# Patient Record
Sex: MALE | Race: WHITE | Employment: FULL TIME | URBAN - NONMETROPOLITAN AREA
[De-identification: names, ages, dates, MRNs, and addresses within clinical notes are randomized per-mention and may not be internally consistent; named-entity substitution may affect disease eponyms.]

---

## 2020-02-10 ENCOUNTER — TELEPHONE (OUTPATIENT)
Dept: GASTROENTEROLOGY | Age: 55
End: 2020-02-10

## 2020-02-11 ENCOUNTER — TELEPHONE (OUTPATIENT)
Dept: GASTROENTEROLOGY | Age: 55
End: 2020-02-11

## 2020-02-14 ENCOUNTER — OFFICE VISIT (OUTPATIENT)
Dept: GASTROENTEROLOGY | Age: 55
End: 2020-02-14
Payer: COMMERCIAL

## 2020-02-14 VITALS
WEIGHT: 212.2 LBS | BODY MASS INDEX: 26.38 KG/M2 | HEIGHT: 75 IN | HEART RATE: 58 BPM | OXYGEN SATURATION: 97 % | SYSTOLIC BLOOD PRESSURE: 118 MMHG | DIASTOLIC BLOOD PRESSURE: 78 MMHG

## 2020-02-14 LAB — TRIGL SERPL-MCNC: 185 MG/DL (ref 0–149)

## 2020-02-14 PROCEDURE — 99203 OFFICE O/P NEW LOW 30 MIN: CPT | Performed by: NURSE PRACTITIONER

## 2020-02-14 RX ORDER — OMEPRAZOLE 20 MG/1
CAPSULE, DELAYED RELEASE ORAL
COMMUNITY
Start: 2020-01-29

## 2020-02-14 ASSESSMENT — ENCOUNTER SYMPTOMS
RECTAL PAIN: 0
NAUSEA: 0
TROUBLE SWALLOWING: 0
ABDOMINAL DISTENTION: 0
SHORTNESS OF BREATH: 0
CONSTIPATION: 0
ANAL BLEEDING: 0
DIARRHEA: 0
SORE THROAT: 0
VOMITING: 0
BLOOD IN STOOL: 0
BACK PAIN: 0
ABDOMINAL PAIN: 0
VOICE CHANGE: 0
COUGH: 0

## 2020-02-14 NOTE — PATIENT INSTRUCTIONS
You are going to have an Endoscopy and here are some basic instructions:    Nothing to eat or drink after midnight EXCEPT:  PLEASE TAKE MEDICATION(S) FOR HIGH BLOOD PRESSURE, SEIZURES, HEART, AND THYROID WITH A SIP OF WATER AT LEAST 2 HOURS PRIOR TO ARRIVAL TIME.   YOU MAY ALSO TAKE ANY INHALERS YOU ARE PRESCRIBED. You will not be able to drive for 24 hours after the procedure due to sedation. Bring a  with you the day of the procedure. No aspirin, ibuprofen, naproxen, fish oil or vitamin E for 5 days before procedure. Continue current medications. If you are on blood thinners, clearance from the prescribing physician will be obtained before your procedure is scheduled. Increased [email or web address] may be associated with discontinuation of your blood thinner and include, but not limited to, stroke, TIA, or cardiac event. If biopsies are taken during the procedure they will be sent to a pathologist for analysis. You will be notified by mail of the pathology results in 2-3 weeks. Your physician may also schedule a follow up appointment with the nurse practitioner to discuss pathology, symptoms or to check if you have had any problems related to your procedure. If you prefer not to return to the office after your procedure please discuss this with your physician on the day of your procedure.

## 2020-02-14 NOTE — PROGRESS NOTES
asthma exacerbation
and neck pain. Neurological: Negative for dizziness, weakness, light-headedness and headaches. Psychiatric/Behavioral: Negative for dysphoric mood and sleep disturbance. The patient is not nervous/anxious. All other systems reviewed and are negative. Objective:     Physical Exam  Vitals signs and nursing note reviewed. Constitutional:       Appearance: He is well-developed. Comments: /78   Pulse 58   Ht 6' 3\" (1.905 m)   Wt 212 lb 3.2 oz (96.3 kg)   SpO2 97%   BMI 26.52 kg/m²    Eyes:      General: No scleral icterus. Conjunctiva/sclera: Conjunctivae normal.      Pupils: Pupils are equal, round, and reactive to light. Neck:      Musculoskeletal: Normal range of motion and neck supple. Thyroid: No thyromegaly. Cardiovascular:      Rate and Rhythm: Normal rate and regular rhythm. Heart sounds: Normal heart sounds. No murmur. No friction rub. No gallop. Pulmonary:      Effort: Pulmonary effort is normal. No respiratory distress. Breath sounds: Normal breath sounds. Abdominal:      General: Bowel sounds are normal. There is no distension. Palpations: Abdomen is soft. Tenderness: There is no abdominal tenderness. There is no rebound. Musculoskeletal: Normal range of motion. General: No deformity. Skin:     Coloration: Skin is not pale. Neurological:      Mental Status: He is alert and oriented to person, place, and time. Cranial Nerves: No cranial nerve deficit. Psychiatric:         Judgment: Judgment normal.           Assessment:       Diagnosis Orders   1. Recurrent pancreatitis  ENDOSCOPIC ULTRASOUND (UPPER)    ESOPHAGOSCOPY / EGD    Triglyceride    IgG 4         Plan:      1. Labs today- will call him with results. 2. EGD with EUS  Schedule outpatient endoscopy/EUS. Patient advised no Aspirin, Fish Oil, Vit E or NSAIDs 5 (five) days before procedure.   Follow-up Visit: per Dr. Radha Estes per pt request  Pt Education:   Risks,

## 2020-02-17 LAB — IGG 4: 34 MG/DL (ref 1–123)

## 2020-02-18 ENCOUNTER — TELEPHONE (OUTPATIENT)
Dept: GASTROENTEROLOGY | Age: 55
End: 2020-02-18

## 2020-02-18 NOTE — TELEPHONE ENCOUNTER
----- Message from Eldora Paget, APRN sent at 2/17/2020  9:22 PM CST -----  Please let Ailin Salmeron know the autoimmune marker for pancreatitis is normal. Also let him know his triglyceride level is a little elevated at 185, normal is <150, but this slight elevation would not cause pancreatitis.  thanks

## 2020-03-17 ENCOUNTER — ANESTHESIA EVENT (OUTPATIENT)
Dept: ENDOSCOPY | Age: 55
End: 2020-03-17
Payer: COMMERCIAL

## 2020-03-17 ENCOUNTER — TELEPHONE (OUTPATIENT)
Dept: GASTROENTEROLOGY | Age: 55
End: 2020-03-17

## 2020-03-18 ENCOUNTER — HOSPITAL ENCOUNTER (OUTPATIENT)
Age: 55
Setting detail: OUTPATIENT SURGERY
Discharge: HOME | End: 2020-03-18
Attending: INTERNAL MEDICINE | Admitting: INTERNAL MEDICINE
Payer: COMMERCIAL

## 2020-03-18 ENCOUNTER — ANESTHESIA (OUTPATIENT)
Dept: ENDOSCOPY | Age: 55
End: 2020-03-18
Payer: COMMERCIAL

## 2020-03-18 VITALS
SYSTOLIC BLOOD PRESSURE: 117 MMHG | OXYGEN SATURATION: 100 % | HEIGHT: 75 IN | DIASTOLIC BLOOD PRESSURE: 69 MMHG | TEMPERATURE: 97.1 F | HEART RATE: 66 BPM | BODY MASS INDEX: 26.11 KG/M2 | WEIGHT: 210 LBS | RESPIRATION RATE: 20 BRPM

## 2020-03-18 VITALS
SYSTOLIC BLOOD PRESSURE: 100 MMHG | DIASTOLIC BLOOD PRESSURE: 64 MMHG | RESPIRATION RATE: 20 BRPM | OXYGEN SATURATION: 97 %

## 2020-03-18 PROCEDURE — 2500000003 HC RX 250 WO HCPCS: Performed by: NURSE ANESTHETIST, CERTIFIED REGISTERED

## 2020-03-18 PROCEDURE — 6360000002 HC RX W HCPCS: Performed by: NURSE ANESTHETIST, CERTIFIED REGISTERED

## 2020-03-18 PROCEDURE — 7100000011 HC PHASE II RECOVERY - ADDTL 15 MIN: Performed by: INTERNAL MEDICINE

## 2020-03-18 PROCEDURE — 3700000001 HC ADD 15 MINUTES (ANESTHESIA): Performed by: INTERNAL MEDICINE

## 2020-03-18 PROCEDURE — 3609018500 HC EGD US SCOPE W/ADJACENT STRUCTURES: Performed by: INTERNAL MEDICINE

## 2020-03-18 PROCEDURE — 2580000003 HC RX 258: Performed by: INTERNAL MEDICINE

## 2020-03-18 PROCEDURE — 3700000000 HC ANESTHESIA ATTENDED CARE: Performed by: INTERNAL MEDICINE

## 2020-03-18 PROCEDURE — 43259 EGD US EXAM DUODENUM/JEJUNUM: CPT | Performed by: INTERNAL MEDICINE

## 2020-03-18 PROCEDURE — 7100000010 HC PHASE II RECOVERY - FIRST 15 MIN: Performed by: INTERNAL MEDICINE

## 2020-03-18 PROCEDURE — 2709999900 HC NON-CHARGEABLE SUPPLY: Performed by: INTERNAL MEDICINE

## 2020-03-18 RX ORDER — SODIUM CHLORIDE, SODIUM LACTATE, POTASSIUM CHLORIDE, CALCIUM CHLORIDE 600; 310; 30; 20 MG/100ML; MG/100ML; MG/100ML; MG/100ML
INJECTION, SOLUTION INTRAVENOUS CONTINUOUS
Status: DISCONTINUED | OUTPATIENT
Start: 2020-03-18 | End: 2020-03-18 | Stop reason: HOSPADM

## 2020-03-18 RX ORDER — MIDAZOLAM HYDROCHLORIDE 1 MG/ML
INJECTION INTRAMUSCULAR; INTRAVENOUS PRN
Status: DISCONTINUED | OUTPATIENT
Start: 2020-03-18 | End: 2020-03-18 | Stop reason: SDUPTHER

## 2020-03-18 RX ORDER — LIDOCAINE HYDROCHLORIDE 20 MG/ML
INJECTION, SOLUTION INFILTRATION; PERINEURAL PRN
Status: DISCONTINUED | OUTPATIENT
Start: 2020-03-18 | End: 2020-03-18 | Stop reason: SDUPTHER

## 2020-03-18 RX ORDER — GLYCOPYRROLATE 0.2 MG/ML
INJECTION INTRAMUSCULAR; INTRAVENOUS PRN
Status: DISCONTINUED | OUTPATIENT
Start: 2020-03-18 | End: 2020-03-18 | Stop reason: SDUPTHER

## 2020-03-18 RX ORDER — FENTANYL CITRATE 50 UG/ML
INJECTION, SOLUTION INTRAMUSCULAR; INTRAVENOUS PRN
Status: DISCONTINUED | OUTPATIENT
Start: 2020-03-18 | End: 2020-03-18 | Stop reason: SDUPTHER

## 2020-03-18 RX ORDER — PROPOFOL 10 MG/ML
INJECTION, EMULSION INTRAVENOUS CONTINUOUS PRN
Status: DISCONTINUED | OUTPATIENT
Start: 2020-03-18 | End: 2020-03-18 | Stop reason: SDUPTHER

## 2020-03-18 RX ADMIN — SODIUM CHLORIDE, SODIUM LACTATE, POTASSIUM CHLORIDE, AND CALCIUM CHLORIDE: 600; 310; 30; 20 INJECTION, SOLUTION INTRAVENOUS at 10:16

## 2020-03-18 RX ADMIN — GLYCOPYRROLATE 0.2 MG: 1 INJECTION INTRAMUSCULAR; INTRAVENOUS at 10:20

## 2020-03-18 RX ADMIN — PROPOFOL 100 MCG/KG/MIN: 10 INJECTION, EMULSION INTRAVENOUS at 10:23

## 2020-03-18 RX ADMIN — MIDAZOLAM 2 MG: 1 INJECTION INTRAMUSCULAR; INTRAVENOUS at 10:19

## 2020-03-18 RX ADMIN — LIDOCAINE HYDROCHLORIDE 40 MG: 20 INJECTION, SOLUTION INFILTRATION; PERINEURAL at 10:23

## 2020-03-18 RX ADMIN — FENTANYL CITRATE 100 MCG: 50 INJECTION INTRAMUSCULAR; INTRAVENOUS at 10:23

## 2020-03-18 ASSESSMENT — PAIN - FUNCTIONAL ASSESSMENT: PAIN_FUNCTIONAL_ASSESSMENT: 0-10

## 2020-03-18 NOTE — ANESTHESIA POSTPROCEDURE EVALUATION
Department of Anesthesiology  Postprocedure Note    Patient: Emilio Garza  MRN: [id number]  YOB: 1965  Date of evaluation: 3/18/2020  Time:  11:00 AM     Procedure Summary     Date:  03/18/20 Room / Location:  [address]    Anesthesia Start:  1016 Anesthesia Stop:      Procedure:  EGD ESOPHAGOGASTRODUODENOSCOPY ULTRASOUND (N/A ) Diagnosis:  (RECURRENT PANCREATITIS)    Surgeon:  Shawna Johns MD Responsible Provider:  MINERVA Lane CRNA    Anesthesia Type:  general, TIVA ASA Status:  2          Anesthesia Type: general, TIVA    Viktor Phase I: Viktor Score: 10    Viktor Phase II:      Last vitals: Reviewed and per EMR flowsheets.        Anesthesia Post Evaluation    Patient location during evaluation: bedside  Patient participation: complete - patient participated  Level of consciousness: sleepy but conscious  Pain score: 0  Airway patency: patent  Nausea & Vomiting: no nausea and no vomiting  Complications: no  Cardiovascular status: hemodynamically stable and blood pressure returned to baseline  Respiratory status: acceptable and nasal cannula  Hydration status: stable

## 2020-03-18 NOTE — OP NOTE
Referring/Primary Care Provider: Prasanth PALMA    Date of Procedure: 03/18/20    Procedure:   1. EGD with Endoscopic Ultrasound     Indications:   1. Reported recurrent pancreatitis (although patient states he has had minimal hospitalizations) - I have no evidence of true lipase elevation of >3x ULN. He is s/p cholecystectomy. Triglycerides normal. IGG4 normal.     Anesthesia:  Sedation was administered by anesthesia who monitored the patient during the procedure. Procedure:   After reviewing the patient's chart, H&P, medications, obtaining informed consent, and discussing risks benefits and alternatives to the procedure the patient was placed in the left lateral decubitus position. A oblique viewing Olympus 160 Radial EUS scope was lubricated and inserted through the mouth into the oropharynx. Under indirect visualization, the upper esophagus was intubated. The scope was advanced to the level of the third portion of duodenum with limited views of the esophageal mucosa. Findings and maneuvers are listed in impression below. The patient tolerated the procedure well. There were no immediate complications. Findings:   Endoscopic Finding:   - Endoscopic views of the esophagus, stomach, and duodenum are all normal.     Endosonographic Findings:  - The celiac axis and associated vascular structures was identified and examined. No concerning or malignant lymphadenopathy was identified.     - Limited views of the left lobe of the liver revealed no biliary dilation or focal mass. - The EUS scope was advanced to the duodenal bulb. The CBD appears normal.  No Filling defects noted. - Pancreas: pancreatic parenchyma appeared normal.  There is no evidence of pancreatic ductal dilatation. There is no peripancreatic lymphadenopathy. Do not appreciate pancreatic divisum although EUS has overall low sensitivity for this. Estimated Blood Loss: minimal    IMPRESSION:  1.  No obvious cause for

## 2020-03-25 ENCOUNTER — TELEPHONE (OUTPATIENT)
Dept: GASTROENTEROLOGY | Age: 55
End: 2020-03-25

## 2020-03-25 NOTE — TELEPHONE ENCOUNTER
Pt called yesterday thinking that we were supposed to have already set him up for MRCP, after his 3/18/20 procedure with Dr Yessica Maddox. I read the note and it stated, \"if. .. Crow Burk \" I did check with Dr Yessica Maddox yesterday after this and he said we could go ahead and scheduled the MRCP. I called and discussed with patient today. Due to COVID-19 procedures, this is not emergent and they will likely schedule this at the end of May. We will need it scheduled before his June apt with Genora Gowers, APRN. PT is to either call or send a [employer] message if he needs something prior to this. Order is in [employer].

## 2020-03-27 NOTE — H&P
Patient Name: Rajat Garrett  : 1965  MRN: [id number]  DATE: 20    Allergies: Allergies   Allergen Reactions    Morphine Itching    Penicillins         ENDOSCOPY  History and Physical    Procedure:    [] Diagnostic Colonoscopy       [] Screening Colonoscopy  [x] EGD      [] ERCP      [x] EUS       [] Other    [x] Previous office notes/History and Physical reviewed from the patients chart. Please see EMR for further details of HPI. I have examined the patient's status immediately prior to the procedure and:      Indications/HPI:    []Abdominal Pain   []Cancer- GI/Lung     []Fhx of colon CA/polyps  []History of Polyps  []Barretts            []Melena  []Abnormal Imaging              []Dysphagia              []Persistent Pneumonia   []Anemia                            []Food Impaction        []History of Polyps  [] GI Bleed             []Pulmonary nodule/Mass   []Change in bowel habits []Heartburn/Reflux  []Rectal Bleed (BRBPR)  []Chest Pain - Non Cardiac []Heme (+) Stool []Ulcers  []Constipation  []Hemoptysis  []Varices  []Diarrhea  []Hypoxemia    []Nausea/Vomiting   []Screening   []Crohns/Colitis  [x]Other: pancreatitis     Anesthesia:   [x] MAC [] Moderate Sedation   [] General   [] None     ROS: 12 pt Review of Symptoms was negative unless mentioned above    Medications:   Prior to Admission medications    Medication Sig Start Date End Date Taking?  Authorizing Provider   omeprazole (PRILOSEC) 20 MG delayed release capsule  20  Yes Historical Provider, MD       Past Medical History:  Past Medical History:   Diagnosis Date    GERD (gastroesophageal reflux disease)     Pancreatitis        Past Surgical History:  Past Surgical History:   Procedure Laterality Date    CHOLECYSTECTOMY      UPPER GASTROINTESTINAL ENDOSCOPY N/A 3/18/2020    Dr Nuzhat Valladares-w/EUS-No obvious cause for pancreatitis currently       Social History:  Social History     Tobacco Use    Smoking status: Former Smoker Packs/day: 0.50     Years: 15.00     Pack years: 7.50     Last attempt to quit: 1996     Years since quittin.1    Smokeless tobacco: Never Used   Substance Use Topics    Alcohol use: Yes     Comment: social    Drug use: Never       Vital Signs:   Vitals:    20 1126   BP: 117/69   Pulse: 66   Resp: 20   Temp:    SpO2: 100%        Physical Exam:  Cardiac:  [x]WNL  []Comments:  Pulmonary:  [x]WNL   []Comments:  Neuro/Mental Status:  [x]WNL  []Comments:  Abdominal:  [x]WNL    []Comments:  Other:   []WNL  []Comments:    Informed Consent:  The risks and benefits of the procedure have been discussed with either the patient or if they cannot consent, their representative. Assessment:  Patient examined and appropriate for planned sedation and procedure. Plan:  Proceed with planned sedation and procedure as above.          Fernanda De La O MD

## 2020-06-04 ENCOUNTER — TELEPHONE (OUTPATIENT)
Dept: GASTROENTEROLOGY | Age: 55
End: 2020-06-04

## 2020-06-04 NOTE — TELEPHONE ENCOUNTER
Pt advised he is scheduled for MRCP @ [address] [facility], [email or web address], arrival is 2:30 PM, npo 6-8 hours prior, bring ins and id. He will keep his apt with [facility] @ 6/18/20. MRCP was recommended when he had procedure with Dr La Gusman 3/18/20. This was pushed out due to Theo.

## 2020-06-16 ENCOUNTER — HOSPITAL ENCOUNTER (OUTPATIENT)
Dept: MRI IMAGING | Age: 55
Discharge: HOME | End: 2020-06-16
Payer: COMMERCIAL

## 2020-06-16 ENCOUNTER — TELEPHONE (OUTPATIENT)
Dept: GASTROENTEROLOGY | Age: 55
End: 2020-06-16

## 2020-06-16 PROCEDURE — A9577 INJ MULTIHANCE: HCPCS | Performed by: INTERNAL MEDICINE

## 2020-06-16 PROCEDURE — 6360000004 HC RX CONTRAST MEDICATION: Performed by: INTERNAL MEDICINE

## 2020-06-16 PROCEDURE — 74183 MRI ABD W/O CNTR FLWD CNTR: CPT

## 2020-06-16 RX ADMIN — GADOBENATE DIMEGLUMINE 20 ML: 529 INJECTION, SOLUTION INTRAVENOUS at 16:38

## 2020-06-16 NOTE — TELEPHONE ENCOUNTER
I saw this pt in Feb 2020 and requested he f/u with Dr Alondra Bailey after scope due to the multiple of questions that I was not able to provide him with satisfied answers. I put on my checkout note to f/u with Alondra Bailey, but he is on my schedule for this Thursday.  This needs to be changed please, thanks

## 2020-06-19 ENCOUNTER — PATIENT MESSAGE (OUTPATIENT)
Dept: GASTROENTEROLOGY | Age: 55
End: 2020-06-19

## 2020-09-08 ENCOUNTER — TELEPHONE (OUTPATIENT)
Dept: GASTROENTEROLOGY | Age: 55
End: 2020-09-08

## 2020-09-08 ENCOUNTER — APPOINTMENT (OUTPATIENT)
Dept: CT IMAGING | Age: 55
End: 2020-09-08
Payer: COMMERCIAL

## 2020-09-08 ENCOUNTER — HOSPITAL ENCOUNTER (EMERGENCY)
Age: 55
Discharge: HOME | End: 2020-09-08
Attending: EMERGENCY MEDICINE
Payer: COMMERCIAL

## 2020-09-08 VITALS
RESPIRATION RATE: 20 BRPM | TEMPERATURE: 98 F | WEIGHT: 225 LBS | HEART RATE: 78 BPM | HEIGHT: 75 IN | SYSTOLIC BLOOD PRESSURE: 147 MMHG | DIASTOLIC BLOOD PRESSURE: 88 MMHG | BODY MASS INDEX: 27.98 KG/M2 | OXYGEN SATURATION: 95 %

## 2020-09-08 LAB
ALBUMIN SERPL-MCNC: 4.4 G/DL (ref 3.5–5.2)
ALP BLD-CCNC: 110 U/L (ref 40–130)
ALT SERPL-CCNC: 38 U/L (ref 5–41)
ANION GAP SERPL CALCULATED.3IONS-SCNC: 12 MMOL/L (ref 7–19)
AST SERPL-CCNC: 27 U/L (ref 5–40)
BASOPHILS ABSOLUTE: 0 K/UL (ref 0–0.2)
BASOPHILS RELATIVE PERCENT: 0.1 % (ref 0–1)
BILIRUB SERPL-MCNC: 0.8 MG/DL (ref 0.2–1.2)
BUN BLDV-MCNC: 14 MG/DL (ref 6–20)
CALCIUM SERPL-MCNC: 9.4 MG/DL (ref 8.6–10)
CHLORIDE BLD-SCNC: 103 MMOL/L (ref 98–111)
CO2: 27 MMOL/L (ref 22–29)
CREAT SERPL-MCNC: 0.9 MG/DL (ref 0.5–1.2)
EOSINOPHILS ABSOLUTE: 0 K/UL (ref 0–0.6)
EOSINOPHILS RELATIVE PERCENT: 0.2 % (ref 0–5)
GFR AFRICAN AMERICAN: >59
GFR NON-AFRICAN AMERICAN: >60
GLUCOSE BLD-MCNC: 108 MG/DL (ref 74–109)
HCT VFR BLD CALC: 45.7 % (ref 42–52)
HEMOGLOBIN: 15.7 G/DL (ref 14–18)
IMMATURE GRANULOCYTES #: 0.1 K/UL
LACTIC ACID: 1.3 MMOL/L (ref 0.5–1.9)
LIPASE: 286 U/L (ref 13–60)
LYMPHOCYTES ABSOLUTE: 0.9 K/UL (ref 1.1–4.5)
LYMPHOCYTES RELATIVE PERCENT: 7.4 % (ref 20–40)
MCH RBC QN AUTO: 30.3 PG (ref 27–31)
MCHC RBC AUTO-ENTMCNC: 34.4 G/DL (ref 33–37)
MCV RBC AUTO: 88.2 FL (ref 80–94)
MONOCYTES ABSOLUTE: 0.6 K/UL (ref 0–0.9)
MONOCYTES RELATIVE PERCENT: 5.4 % (ref 0–10)
NEUTROPHILS ABSOLUTE: 9.9 K/UL (ref 1.5–7.5)
NEUTROPHILS RELATIVE PERCENT: 86.4 % (ref 50–65)
PDW BLD-RTO: 11.4 % (ref 11.5–14.5)
PLATELET # BLD: 176 K/UL (ref 130–400)
PMV BLD AUTO: 11.8 FL (ref 9.4–12.4)
POTASSIUM SERPL-SCNC: 4.3 MMOL/L (ref 3.5–5)
RBC # BLD: 5.18 M/UL (ref 4.7–6.1)
SODIUM BLD-SCNC: 142 MMOL/L (ref 136–145)
TOTAL PROTEIN: 6.9 G/DL (ref 6.6–8.7)
WBC # BLD: 11.4 K/UL (ref 4.8–10.8)

## 2020-09-08 PROCEDURE — 83605 ASSAY OF LACTIC ACID: CPT

## 2020-09-08 PROCEDURE — 2580000003 HC RX 258: Performed by: EMERGENCY MEDICINE

## 2020-09-08 PROCEDURE — 74177 CT ABD & PELVIS W/CONTRAST: CPT

## 2020-09-08 PROCEDURE — 36415 COLL VENOUS BLD VENIPUNCTURE: CPT

## 2020-09-08 PROCEDURE — 96374 THER/PROPH/DIAG INJ IV PUSH: CPT

## 2020-09-08 PROCEDURE — 83690 ASSAY OF LIPASE: CPT

## 2020-09-08 PROCEDURE — 99999 PR OFFICE/OUTPT VISIT,PROCEDURE ONLY: CPT | Performed by: EMERGENCY MEDICINE

## 2020-09-08 PROCEDURE — 80053 COMPREHEN METABOLIC PANEL: CPT

## 2020-09-08 PROCEDURE — 93005 ELECTROCARDIOGRAM TRACING: CPT | Performed by: EMERGENCY MEDICINE

## 2020-09-08 PROCEDURE — 85025 COMPLETE CBC W/AUTO DIFF WBC: CPT

## 2020-09-08 PROCEDURE — 96375 TX/PRO/DX INJ NEW DRUG ADDON: CPT

## 2020-09-08 PROCEDURE — 99284 EMERGENCY DEPT VISIT MOD MDM: CPT

## 2020-09-08 PROCEDURE — 6360000004 HC RX CONTRAST MEDICATION: Performed by: EMERGENCY MEDICINE

## 2020-09-08 PROCEDURE — 6360000002 HC RX W HCPCS: Performed by: EMERGENCY MEDICINE

## 2020-09-08 PROCEDURE — 99285 EMERGENCY DEPT VISIT HI MDM: CPT

## 2020-09-08 RX ORDER — KETOROLAC TROMETHAMINE 30 MG/ML
30 INJECTION, SOLUTION INTRAMUSCULAR; INTRAVENOUS ONCE
Status: COMPLETED | OUTPATIENT
Start: 2020-09-08 | End: 2020-09-08

## 2020-09-08 RX ORDER — ONDANSETRON 4 MG/1
4 TABLET, ORALLY DISINTEGRATING ORAL EVERY 8 HOURS PRN
Qty: 15 TABLET | Refills: 0 | Status: SHIPPED | OUTPATIENT
Start: 2020-09-08

## 2020-09-08 RX ORDER — HYDROCODONE BITARTRATE AND ACETAMINOPHEN 5; 325 MG/1; MG/1
2 TABLET ORAL EVERY 6 HOURS PRN
Qty: 10 TABLET | Refills: 0 | Status: SHIPPED | OUTPATIENT
Start: 2020-09-08 | End: 2020-09-15

## 2020-09-08 RX ORDER — ONDANSETRON 2 MG/ML
4 INJECTION INTRAMUSCULAR; INTRAVENOUS ONCE
Status: COMPLETED | OUTPATIENT
Start: 2020-09-08 | End: 2020-09-08

## 2020-09-08 RX ORDER — SODIUM CHLORIDE, SODIUM LACTATE, POTASSIUM CHLORIDE, AND CALCIUM CHLORIDE .6; .31; .03; .02 G/100ML; G/100ML; G/100ML; G/100ML
1000 INJECTION, SOLUTION INTRAVENOUS ONCE
Status: COMPLETED | OUTPATIENT
Start: 2020-09-08 | End: 2020-09-08

## 2020-09-08 RX ADMIN — IOPAMIDOL 90 ML: 755 INJECTION, SOLUTION INTRAVENOUS at 18:24

## 2020-09-08 RX ADMIN — SODIUM CHLORIDE, POTASSIUM CHLORIDE, SODIUM LACTATE AND CALCIUM CHLORIDE 1000 ML: 600; 310; 30; 20 INJECTION, SOLUTION INTRAVENOUS at 17:56

## 2020-09-08 RX ADMIN — KETOROLAC TROMETHAMINE 30 MG: 30 INJECTION, SOLUTION INTRAMUSCULAR at 17:47

## 2020-09-08 RX ADMIN — ONDANSETRON 4 MG: 2 INJECTION INTRAMUSCULAR; INTRAVENOUS at 17:46

## 2020-09-08 ASSESSMENT — PAIN SCALES - GENERAL
PAINLEVEL_OUTOF10: 9
PAINLEVEL_OUTOF10: 2
PAINLEVEL_OUTOF10: 7

## 2020-09-08 ASSESSMENT — PAIN DESCRIPTION - ORIENTATION: ORIENTATION: UPPER

## 2020-09-08 ASSESSMENT — PAIN DESCRIPTION - LOCATION: LOCATION: ABDOMEN

## 2020-09-08 NOTE — TELEPHONE ENCOUNTER
IMPRESSION:  1. No obvious cause for pancreatitis currently.      RECOMMENDATIONS:   - Continue PPI  - NSAID avoidance  - If patient has true repeat evidence of pancreatitis (lipase elevation >3x ULN or evidence of pancreatitis on a contrasted CT scan) then I would consider an MRI with contrast + MRCP to better evaluate and rule out anatomic causes for recurrent pancreatitis.      The results were discussed with the patient and family. A copy of the images obtained were given to the patient.      Ulysses Green MD  03/18/20  10:51 AM      Onofre santos 2-14-20. Egd per  3-18-20. No FU scheduled. Patient called today from [phone] leaving a VM that he felt he might be flaring with Pancreatitis again and had though about going to ED for evaluation. I called the patient back , he did not answer his phone, I looked in Epic and I see where he is at the ED now but no note from ED are available at present as of 9-8-20 @ 4:20 pm. I will forward to [facility] apr to review tomorrow. I did leave the patient a VM when I called and told him if flaring with pancreatitis and having severe pain then going to the ED was the best thing to do. I will forward to [facility] apr to review tomorrow when she returns to clinic.  Napoleon christianson

## 2020-09-08 NOTE — ED PROVIDER NOTES
UPPER GASTROINTESTINAL ENDOSCOPY N/A 3/18/2020    Dr Dara Valladares-w/EUS-No obvious cause for pancreatitis currently         CURRENT MEDICATIONS     Discharge Medication List as of 2020  8:13 PM      CONTINUE these medications which have NOT CHANGED    Details   omeprazole (PRILOSEC) 20 MG delayed release capsule Historical Med             ALLERGIES     Morphine and Penicillins    FAMILY HISTORY       Family History   Problem Relation Age of Onset    Diabetes Mother           SOCIAL HISTORY       Social History     Socioeconomic History    Marital status:      Spouse name: None    Number of children: None    Years of education: None    Highest education level: None   Occupational History    None   Social Needs    Financial resource strain: None    Food insecurity     Worry: None     Inability: None    Transportation needs     Medical: None     Non-medical: None   Tobacco Use    Smoking status: Former Smoker     Packs/day: 0.50     Years: 15.00     Pack years: 7.50     Last attempt to quit: 1996     Years since quittin.6    Smokeless tobacco: Never Used   Substance and Sexual Activity    Alcohol use: Not Currently     Alcohol/week: 1.0 standard drinks     Types: 1 Shots of liquor per week     Comment: social    Drug use: Never    Sexual activity: None   Lifestyle    Physical activity     Days per week: None     Minutes per session: None    Stress: None   Relationships    Social connections     Talks on phone: None     Gets together: None     Attends Sikhism service: None     Active member of club or organization: None     Attends meetings of clubs or organizations: None     Relationship status: None    Intimate partner violence     Fear of current or ex partner: None     Emotionally abused: None     Physically abused: None     Forced sexual activity: None   Other Topics Concern    None   Social History Narrative    None       SCREENINGS    [place] Coma Scale  Eye Opening: Spontaneous  Best Verbal Response: Oriented  Best Motor Response: Obeys commands  [place] Coma Scale Score: 15        PHYSICAL EXAM    (up to 7 for level 4, 8 or more for level 5)     ED Triage Vitals [09/08/20 1614]   BP Temp Temp src Pulse Resp SpO2 Height Weight   (!) 156/90 98 °F (36.7 °C) -- 78 20 97 % 6' 3\" (1.905 m) 225 lb (102.1 kg)       Physical Exam  Vitals signs and nursing note reviewed. HENT:      Head: Atraumatic. Mouth/Throat:      Mouth: Mucous membranes are not dry. Pharynx: No posterior oropharyngeal erythema. Eyes:      General: No scleral icterus. Pupils: Pupils are equal, round, and reactive to light. Neck:      Trachea: No tracheal deviation. Cardiovascular:      Rate and Rhythm: Normal rate and regular rhythm. Heart sounds: Normal heart sounds. No murmur. Pulmonary:      Effort: Pulmonary effort is normal. No respiratory distress. Breath sounds: Normal breath sounds. No stridor. Abdominal:      General: There is no distension. Palpations: Abdomen is soft. Tenderness: There is abdominal tenderness (moderate, upper mid abdomen). There is no guarding. Musculoskeletal:      Right lower leg: No edema. Left lower leg: No edema. Skin:     Capillary Refill: Capillary refill takes less than 2 seconds. Coloration: Skin is not pale. Findings: No rash. Neurological:      Mental Status: He is alert and oriented to person, place, and time. Psychiatric:         Behavior: Behavior is cooperative. DIAGNOSTIC RESULTS     EKG: All EKG's areinterpreted by the Emergency Department Physician who either signs or Co-signs this chart in the absence of a cardiologist.    NSR @ 75, no ST elevation noted.     RADIOLOGY:  Non-plain film images such as CT, Ultrasound and MRI are read by the radiologist. Plain radiographic images are visualized and preliminarily interpreted bythe emergency physician with the below findings:        [address] [place] [address] IV CONTRAST Additional Contrast? None   Final Result   1. Pancreatitis   2. 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 Diverticulosis   4. Bilateral parapelvic renal cysts. Signed by Dr Sheyla Vitale on 9/8/2020 7:41 PM              LABS:  Labs Reviewed   CBC WITH AUTO DIFFERENTIAL - Abnormal; Notable for the following components:       Result Value    WBC 11.4 (*)     RDW 11.4 (*)     Neutrophils % 86.4 (*)     Lymphocytes % 7.4 (*)     Neutrophils Absolute 9.9 (*)     Lymphocytes Absolute 0.9 (*)     All other components within normal limits   LIPASE - Abnormal; Notable for the following components:    Lipase 286 (*)     All other components within normal limits   COMPREHENSIVE METABOLIC PANEL   LACTIC ACID, PLASMA       All other labs were within normal range or not returned as of this dictation. EMERGENCY DEPARTMENT COURSE and DIFFERENTIAL DIAGNOSIS/MDM:   Vitals:    Vitals:    09/08/20 1732 09/08/20 1806 09/08/20 1903 09/08/20 1932   BP: (!) 156/95 (!) 149/86 (!) 144/85 (!) 147/88   Pulse:       Resp:       Temp:       SpO2: 97% 99% 97% 95%   Weight:       Height:           MDM    Reassessment    Patient feeling better after some IV fluids, IV Toradol and Zofran. He would like to try to attempt discharge home. His laboratory studies look okay and I feel that this is the reason. PROCEDURES:  Unless otherwise noted below, none     Procedures    FINAL IMPRESSION      1.  Acute pancreatitis without infection or necrosis, unspecified pancreatitis type          DISPOSITION/PLAN   DISPOSITION Decision To Discharge 09/08/2020 07:56:13 PM      PATIENT REFERRED TO:  [id number] [facility] [address]  [phone]            DISCHARGE MEDICATIONS:  Discharge Medication List as of 9/8/2020  8:13 PM      START taking these medications    Details   ondansetron (ZOFRAN ODT) 4 MG disintegrating tablet Take 1 tablet by mouth every 8 hours as needed for Nausea or Vomiting, Disp-15 tablet,R-0Print      HYDROcodone-acetaminophen (NORCO) 5-325 MG per tablet Take 2 tablets by mouth every 6 hours as needed for Pain for up to 7 days. , Disp-10 tablet,R-0Print                (Please note that portions of this note were completed with a voice recognition program.  Efforts were made to edit thedictations but occasionally words are mis-transcribed.)    Vamsi Louis MD (electronically signed)  Attending Emergency Physician          Vamsi Louis MD  09/28/20 [id number]

## 2020-09-08 NOTE — TELEPHONE ENCOUNTER
Ok, there still arent any notes/labs/etc in epic at this time. if he has a f/u appt it needs to be with Dr Loc Ronquillo, thanks.

## 2020-09-08 NOTE — ED TRIAGE NOTES
Pt to ED with c/o abdominal pain due to possible pancreatitis attack Pt reports Hx of abdominal pain and pancreatitis

## 2020-09-09 LAB
EKG P AXIS: -14 DEGREES
EKG P-R INTERVAL: 196 MS
EKG Q-T INTERVAL: 352 MS
EKG QRS DURATION: 88 MS
EKG QTC CALCULATION (BAZETT): 378 MS
EKG T AXIS: 27 DEGREES

## 2020-09-09 PROCEDURE — 93010 ELECTROCARDIOGRAM REPORT: CPT | Performed by: INTERNAL MEDICINE

## 2020-09-09 NOTE — TELEPHONE ENCOUNTER
9-9-20  I tried calling this patient this morning to see how he was feeling after having gone to ED for his pancreatitis, he did not answer his phone and no VM picked up. [facility] aprn had mentioned a FU with  for further evaluation. I will try the patient back again later today.  Napoleon christianson

## 2020-09-28 ASSESSMENT — ENCOUNTER SYMPTOMS
ABDOMINAL PAIN: 1
BLOOD IN STOOL: 0
VOMITING: 1
SHORTNESS OF BREATH: 0
NAUSEA: 1
ABDOMINAL DISTENTION: 0
DIARRHEA: 0

## 2024-03-20 ENCOUNTER — APPOINTMENT (OUTPATIENT)
Dept: GENERAL RADIOLOGY | Age: 59
DRG: 661 | End: 2024-03-20
Attending: STUDENT IN AN ORGANIZED HEALTH CARE EDUCATION/TRAINING PROGRAM
Payer: COMMERCIAL

## 2024-03-20 ENCOUNTER — HOSPITAL ENCOUNTER (INPATIENT)
Age: 59
LOS: 2 days | Discharge: HOME | DRG: 661 | End: 2024-03-22
Attending: STUDENT IN AN ORGANIZED HEALTH CARE EDUCATION/TRAINING PROGRAM
Payer: COMMERCIAL

## 2024-03-20 DIAGNOSIS — R10.9 LEFT FLANK PAIN: ICD-10-CM

## 2024-03-20 DIAGNOSIS — N13.30 HYDRONEPHROSIS, LEFT: ICD-10-CM

## 2024-03-20 PROBLEM — I10 HTN (HYPERTENSION): Status: ACTIVE | Noted: 2024-03-20

## 2024-03-20 PROBLEM — N17.9 AKI (ACUTE KIDNEY INJURY) (HCC): Status: ACTIVE | Noted: 2024-03-20

## 2024-03-20 PROBLEM — K59.00 CONSTIPATION: Status: ACTIVE | Noted: 2024-03-20

## 2024-03-20 LAB
ALBUMIN SERPL-MCNC: 2.7 G/DL (ref 3.5–5.2)
ALP SERPL-CCNC: 152 U/L (ref 40–130)
ALT SERPL-CCNC: 18 U/L (ref 5–41)
ANION GAP SERPL CALCULATED.3IONS-SCNC: 14 MMOL/L (ref 7–19)
AST SERPL-CCNC: 13 U/L (ref 5–40)
BASOPHILS # BLD: 0 K/UL (ref 0–0.2)
BASOPHILS NFR BLD: 0.5 % (ref 0–1)
BILIRUB SERPL-MCNC: 0.3 MG/DL (ref 0.2–1.2)
BUN SERPL-MCNC: 15 MG/DL (ref 6–20)
CALCIUM SERPL-MCNC: 8.6 MG/DL (ref 8.6–10)
CHLORIDE SERPL-SCNC: 102 MMOL/L (ref 98–111)
CO2 SERPL-SCNC: 19 MMOL/L (ref 22–29)
CREAT SERPL-MCNC: 1.3 MG/DL (ref 0.5–1.2)
EOSINOPHIL # BLD: 0.2 K/UL (ref 0–0.6)
EOSINOPHIL NFR BLD: 2 % (ref 0–5)
ERYTHROCYTE [DISTWIDTH] IN BLOOD BY AUTOMATED COUNT: 10.7 % (ref 11.5–14.5)
GLUCOSE SERPL-MCNC: 121 MG/DL (ref 74–109)
HCT VFR BLD AUTO: 33.5 % (ref 42–52)
HGB BLD-MCNC: 11.8 G/DL (ref 14–18)
IMM GRANULOCYTES # BLD: 0 K/UL
LYMPHOCYTES # BLD: 0.8 K/UL (ref 1.1–4.5)
LYMPHOCYTES NFR BLD: 10.8 % (ref 20–40)
MAGNESIUM SERPL-MCNC: 2 MG/DL (ref 1.6–2.6)
MCH RBC QN AUTO: 29.7 PG (ref 27–31)
MCHC RBC AUTO-ENTMCNC: 35.2 G/DL (ref 33–37)
MCV RBC AUTO: 84.4 FL (ref 80–94)
MONOCYTES # BLD: 0.8 K/UL (ref 0–0.9)
MONOCYTES NFR BLD: 11.1 % (ref 0–10)
NEUTROPHILS # BLD: 5.6 K/UL (ref 1.5–7.5)
NEUTS SEG NFR BLD: 75.1 % (ref 50–65)
PLATELET # BLD AUTO: 321 K/UL (ref 130–400)
PMV BLD AUTO: 10.2 FL (ref 9.4–12.4)
POTASSIUM SERPL-SCNC: 4.2 MMOL/L (ref 3.5–5)
PROT SERPL-MCNC: 6.7 G/DL (ref 6.6–8.7)
PTH-INTACT SERPL-MCNC: 68.7 PG/ML (ref 15–65)
RBC # BLD AUTO: 3.97 M/UL (ref 4.7–6.1)
SODIUM SERPL-SCNC: 135 MMOL/L (ref 136–145)
TSH SERPL DL<=0.005 MIU/L-ACNC: 0.36 UIU/ML (ref 0.27–4.2)
URATE SERPL-MCNC: 4.2 MG/DL (ref 3.4–7)
WBC # BLD AUTO: 7.4 K/UL (ref 4.8–10.8)

## 2024-03-20 PROCEDURE — 6360000002 HC RX W HCPCS: Performed by: HOSPITALIST

## 2024-03-20 PROCEDURE — 84550 ASSAY OF BLOOD/URIC ACID: CPT

## 2024-03-20 PROCEDURE — 83970 ASSAY OF PARATHORMONE: CPT

## 2024-03-20 PROCEDURE — 74018 RADEX ABDOMEN 1 VIEW: CPT

## 2024-03-20 PROCEDURE — 82746 ASSAY OF FOLIC ACID SERUM: CPT

## 2024-03-20 PROCEDURE — 87040 BLOOD CULTURE FOR BACTERIA: CPT

## 2024-03-20 PROCEDURE — 1200000000 HC SEMI PRIVATE

## 2024-03-20 PROCEDURE — 6370000000 HC RX 637 (ALT 250 FOR IP): Performed by: HOSPITALIST

## 2024-03-20 PROCEDURE — 36415 COLL VENOUS BLD VENIPUNCTURE: CPT

## 2024-03-20 PROCEDURE — 82607 VITAMIN B-12: CPT

## 2024-03-20 PROCEDURE — 82728 ASSAY OF FERRITIN: CPT

## 2024-03-20 PROCEDURE — 83735 ASSAY OF MAGNESIUM: CPT

## 2024-03-20 PROCEDURE — 83540 ASSAY OF IRON: CPT

## 2024-03-20 PROCEDURE — 85025 COMPLETE CBC W/AUTO DIFF WBC: CPT

## 2024-03-20 PROCEDURE — 84443 ASSAY THYROID STIM HORMONE: CPT

## 2024-03-20 PROCEDURE — 2580000003 HC RX 258: Performed by: HOSPITALIST

## 2024-03-20 PROCEDURE — 82306 VITAMIN D 25 HYDROXY: CPT

## 2024-03-20 PROCEDURE — 80053 COMPREHEN METABOLIC PANEL: CPT

## 2024-03-20 PROCEDURE — 83550 IRON BINDING TEST: CPT

## 2024-03-20 RX ORDER — ACETAMINOPHEN 650 MG/1
650 SUPPOSITORY RECTAL EVERY 6 HOURS PRN
Status: DISCONTINUED | OUTPATIENT
Start: 2024-03-20 | End: 2024-03-22 | Stop reason: HOSPADM

## 2024-03-20 RX ORDER — SODIUM CHLORIDE 9 MG/ML
INJECTION, SOLUTION INTRAVENOUS PRN
Status: DISCONTINUED | OUTPATIENT
Start: 2024-03-20 | End: 2024-03-22 | Stop reason: HOSPADM

## 2024-03-20 RX ORDER — LISINOPRIL 10 MG/1
10 TABLET ORAL DAILY
COMMUNITY

## 2024-03-20 RX ORDER — DOCUSATE SODIUM 100 MG/1
100 CAPSULE, LIQUID FILLED ORAL DAILY
Status: DISCONTINUED | OUTPATIENT
Start: 2024-03-21 | End: 2024-03-22 | Stop reason: HOSPADM

## 2024-03-20 RX ORDER — SODIUM BICARBONATE 650 MG/1
650 TABLET ORAL 2 TIMES DAILY
Status: DISCONTINUED | OUTPATIENT
Start: 2024-03-20 | End: 2024-03-22 | Stop reason: HOSPADM

## 2024-03-20 RX ORDER — SODIUM CHLORIDE 0.9 % (FLUSH) 0.9 %
5-40 SYRINGE (ML) INJECTION PRN
Status: DISCONTINUED | OUTPATIENT
Start: 2024-03-20 | End: 2024-03-22 | Stop reason: HOSPADM

## 2024-03-20 RX ORDER — MAGNESIUM SULFATE IN WATER 40 MG/ML
2000 INJECTION, SOLUTION INTRAVENOUS PRN
Status: DISCONTINUED | OUTPATIENT
Start: 2024-03-20 | End: 2024-03-22 | Stop reason: HOSPADM

## 2024-03-20 RX ORDER — ACETAMINOPHEN 325 MG/1
650 TABLET ORAL EVERY 6 HOURS PRN
Status: DISCONTINUED | OUTPATIENT
Start: 2024-03-20 | End: 2024-03-22 | Stop reason: HOSPADM

## 2024-03-20 RX ORDER — POTASSIUM CHLORIDE 20 MEQ/1
40 TABLET, EXTENDED RELEASE ORAL PRN
Status: DISCONTINUED | OUTPATIENT
Start: 2024-03-20 | End: 2024-03-22 | Stop reason: HOSPADM

## 2024-03-20 RX ORDER — SODIUM CHLORIDE 9 MG/ML
INJECTION, SOLUTION INTRAVENOUS CONTINUOUS
Status: DISCONTINUED | OUTPATIENT
Start: 2024-03-20 | End: 2024-03-22 | Stop reason: HOSPADM

## 2024-03-20 RX ORDER — HYDROMORPHONE HYDROCHLORIDE 1 MG/ML
1 INJECTION, SOLUTION INTRAMUSCULAR; INTRAVENOUS; SUBCUTANEOUS
Status: DISCONTINUED | OUTPATIENT
Start: 2024-03-20 | End: 2024-03-22 | Stop reason: HOSPADM

## 2024-03-20 RX ORDER — ENOXAPARIN SODIUM 100 MG/ML
40 INJECTION SUBCUTANEOUS DAILY
Status: DISCONTINUED | OUTPATIENT
Start: 2024-03-21 | End: 2024-03-21

## 2024-03-20 RX ORDER — PANTOPRAZOLE SODIUM 20 MG/1
20 TABLET, DELAYED RELEASE ORAL
Status: DISCONTINUED | OUTPATIENT
Start: 2024-03-21 | End: 2024-03-22 | Stop reason: HOSPADM

## 2024-03-20 RX ORDER — ONDANSETRON 4 MG/1
4 TABLET, ORALLY DISINTEGRATING ORAL EVERY 8 HOURS PRN
Status: DISCONTINUED | OUTPATIENT
Start: 2024-03-20 | End: 2024-03-22 | Stop reason: HOSPADM

## 2024-03-20 RX ORDER — POTASSIUM CHLORIDE 7.45 MG/ML
10 INJECTION INTRAVENOUS PRN
Status: DISCONTINUED | OUTPATIENT
Start: 2024-03-20 | End: 2024-03-22 | Stop reason: HOSPADM

## 2024-03-20 RX ORDER — ONDANSETRON 2 MG/ML
4 INJECTION INTRAMUSCULAR; INTRAVENOUS EVERY 6 HOURS PRN
Status: DISCONTINUED | OUTPATIENT
Start: 2024-03-20 | End: 2024-03-22 | Stop reason: HOSPADM

## 2024-03-20 RX ORDER — POLYETHYLENE GLYCOL 3350 17 G/17G
17 POWDER, FOR SOLUTION ORAL DAILY PRN
Status: DISCONTINUED | OUTPATIENT
Start: 2024-03-20 | End: 2024-03-22 | Stop reason: HOSPADM

## 2024-03-20 RX ORDER — HYDROMORPHONE HYDROCHLORIDE 1 MG/ML
0.5 INJECTION, SOLUTION INTRAMUSCULAR; INTRAVENOUS; SUBCUTANEOUS EVERY 4 HOURS PRN
Status: DISCONTINUED | OUTPATIENT
Start: 2024-03-20 | End: 2024-03-20

## 2024-03-20 RX ORDER — SODIUM CHLORIDE 0.9 % (FLUSH) 0.9 %
5-40 SYRINGE (ML) INJECTION EVERY 12 HOURS SCHEDULED
Status: DISCONTINUED | OUTPATIENT
Start: 2024-03-20 | End: 2024-03-22 | Stop reason: HOSPADM

## 2024-03-20 RX ORDER — HYDROCODONE BITARTRATE AND ACETAMINOPHEN 5; 325 MG/1; MG/1
1 TABLET ORAL EVERY 6 HOURS PRN
Status: DISCONTINUED | OUTPATIENT
Start: 2024-03-20 | End: 2024-03-21

## 2024-03-20 RX ORDER — CIPROFLOXACIN 2 MG/ML
400 INJECTION, SOLUTION INTRAVENOUS EVERY 12 HOURS
Status: DISCONTINUED | OUTPATIENT
Start: 2024-03-20 | End: 2024-03-21

## 2024-03-20 RX ORDER — LABETALOL HYDROCHLORIDE 5 MG/ML
5 INJECTION, SOLUTION INTRAVENOUS EVERY 6 HOURS PRN
Status: DISCONTINUED | OUTPATIENT
Start: 2024-03-20 | End: 2024-03-22 | Stop reason: HOSPADM

## 2024-03-20 RX ADMIN — SODIUM CHLORIDE, PRESERVATIVE FREE 10 ML: 5 INJECTION INTRAVENOUS at 21:45

## 2024-03-20 RX ADMIN — VANCOMYCIN HYDROCHLORIDE 2500 MG: 10 INJECTION, POWDER, LYOPHILIZED, FOR SOLUTION INTRAVENOUS at 23:25

## 2024-03-20 RX ADMIN — SODIUM BICARBONATE 650 MG: 650 TABLET ORAL at 23:17

## 2024-03-20 RX ADMIN — SODIUM CHLORIDE: 9 INJECTION, SOLUTION INTRAVENOUS at 21:32

## 2024-03-20 RX ADMIN — CIPROFLOXACIN 400 MG: 400 INJECTION, SOLUTION INTRAVENOUS at 21:46

## 2024-03-20 RX ADMIN — HYDROCODONE BITARTRATE AND ACETAMINOPHEN 1 TABLET: 5; 325 TABLET ORAL at 21:31

## 2024-03-20 ASSESSMENT — ENCOUNTER SYMPTOMS
COLOR CHANGE: 0
CONSTIPATION: 0
ABDOMINAL PAIN: 1
SHORTNESS OF BREATH: 0
ABDOMINAL DISTENTION: 1
COUGH: 0
CHEST TIGHTNESS: 0
VOMITING: 0
WHEEZING: 0
NAUSEA: 0

## 2024-03-20 ASSESSMENT — PAIN SCALES - GENERAL
PAINLEVEL_OUTOF10: 2
PAINLEVEL_OUTOF10: 7

## 2024-03-20 ASSESSMENT — PAIN DESCRIPTION - DESCRIPTORS: DESCRIPTORS: SORE

## 2024-03-20 ASSESSMENT — PAIN - FUNCTIONAL ASSESSMENT: PAIN_FUNCTIONAL_ASSESSMENT: ACTIVITIES ARE NOT PREVENTED

## 2024-03-20 ASSESSMENT — PAIN DESCRIPTION - ONSET: ONSET: ON-GOING

## 2024-03-20 ASSESSMENT — PAIN DESCRIPTION - PAIN TYPE: TYPE: ACUTE PAIN

## 2024-03-20 ASSESSMENT — PAIN DESCRIPTION - FREQUENCY: FREQUENCY: CONTINUOUS

## 2024-03-20 ASSESSMENT — PAIN DESCRIPTION - LOCATION: LOCATION: FLANK

## 2024-03-20 ASSESSMENT — PAIN DESCRIPTION - ORIENTATION: ORIENTATION: LEFT

## 2024-03-21 ENCOUNTER — APPOINTMENT (OUTPATIENT)
Dept: GENERAL RADIOLOGY | Age: 59
DRG: 661 | End: 2024-03-21
Attending: STUDENT IN AN ORGANIZED HEALTH CARE EDUCATION/TRAINING PROGRAM
Payer: COMMERCIAL

## 2024-03-21 ENCOUNTER — ANESTHESIA (OUTPATIENT)
Dept: OPERATING ROOM | Age: 59
End: 2024-03-21
Payer: COMMERCIAL

## 2024-03-21 ENCOUNTER — ANESTHESIA EVENT (OUTPATIENT)
Dept: OPERATING ROOM | Age: 59
End: 2024-03-21
Payer: COMMERCIAL

## 2024-03-21 PROBLEM — N28.1 PERIPELVIC (LYMPHATIC) CYST: Status: ACTIVE | Noted: 2024-03-21

## 2024-03-21 PROBLEM — R10.9 LEFT FLANK PAIN: Status: ACTIVE | Noted: 2024-03-21

## 2024-03-21 LAB
25(OH)D3 SERPL-MCNC: 26.9 NG/ML
ALBUMIN SERPL-MCNC: 2.9 G/DL (ref 3.5–5.2)
ALP SERPL-CCNC: 146 U/L (ref 40–130)
ALT SERPL-CCNC: 18 U/L (ref 5–41)
ANION GAP SERPL CALCULATED.3IONS-SCNC: 12 MMOL/L (ref 7–19)
AST SERPL-CCNC: 12 U/L (ref 5–40)
BACTERIA BLD CULT ORG #2: NORMAL
BACTERIA BLD CULT: NORMAL
BILIRUB SERPL-MCNC: 0.3 MG/DL (ref 0.2–1.2)
BILIRUB UR QL STRIP: NEGATIVE
BUN SERPL-MCNC: 15 MG/DL (ref 6–20)
CALCIUM SERPL-MCNC: 8.6 MG/DL (ref 8.6–10)
CHLORIDE SERPL-SCNC: 103 MMOL/L (ref 98–111)
CLARITY UR: CLEAR
CO2 SERPL-SCNC: 22 MMOL/L (ref 22–29)
COLOR UR: YELLOW
CREAT SERPL-MCNC: 1.5 MG/DL (ref 0.5–1.2)
CREAT UR-MCNC: 84.4 MG/DL (ref 39–259)
EKG P AXIS: 12 DEGREES
EKG P-R INTERVAL: 180 MS
EKG Q-T INTERVAL: 354 MS
EKG QRS DURATION: 82 MS
EKG QTC CALCULATION (BAZETT): 387 MS
EKG T AXIS: 28 DEGREES
ERYTHROCYTE [DISTWIDTH] IN BLOOD BY AUTOMATED COUNT: 10.8 % (ref 11.5–14.5)
FERRITIN SERPL-MCNC: 670.1 NG/ML (ref 30–400)
FOLATE SERPL-MCNC: 10.6 NG/ML (ref 4.5–32.2)
GLUCOSE SERPL-MCNC: 114 MG/DL (ref 74–109)
GLUCOSE UR STRIP.AUTO-MCNC: NEGATIVE MG/DL
HCT VFR BLD AUTO: 37.8 % (ref 42–52)
HGB BLD-MCNC: 12.7 G/DL (ref 14–18)
HGB UR STRIP.AUTO-MCNC: NEGATIVE MG/L
IRON SATN MFR SERPL: 12 % (ref 14–50)
IRON SERPL-MCNC: 19 UG/DL (ref 59–158)
KETONES UR STRIP.AUTO-MCNC: NEGATIVE MG/DL
LACTATE BLDV-SCNC: 0.9 MMOL/L (ref 0.5–1.9)
LEUKOCYTE ESTERASE UR QL STRIP.AUTO: NEGATIVE
MCH RBC QN AUTO: 29 PG (ref 27–31)
MCHC RBC AUTO-ENTMCNC: 33.6 G/DL (ref 33–37)
MCV RBC AUTO: 86.3 FL (ref 80–94)
NITRITE UR QL STRIP.AUTO: NEGATIVE
PH UR STRIP.AUTO: 6 [PH] (ref 5–8)
PLATELET # BLD AUTO: 321 K/UL (ref 130–400)
PMV BLD AUTO: 9.4 FL (ref 9.4–12.4)
POTASSIUM SERPL-SCNC: 4.1 MMOL/L (ref 3.5–5)
PROT SERPL-MCNC: 6.7 G/DL (ref 6.6–8.7)
PROT UR STRIP.AUTO-MCNC: NEGATIVE MG/DL
RBC # BLD AUTO: 4.38 M/UL (ref 4.7–6.1)
SODIUM SERPL-SCNC: 137 MMOL/L (ref 136–145)
SODIUM UR-SCNC: 120 MMOL/L
SP GR UR STRIP.AUTO: 1.01 (ref 1–1.03)
TIBC SERPL-MCNC: 156 UG/DL (ref 250–400)
UROBILINOGEN UR STRIP.AUTO-MCNC: 1 E.U./DL
UUN UR-MCNC: 399 MG/DL
VIT B12 SERPL-MCNC: 415 PG/ML (ref 211–946)
WBC # BLD AUTO: 8.9 K/UL (ref 4.8–10.8)

## 2024-03-21 PROCEDURE — 85027 COMPLETE CBC AUTOMATED: CPT

## 2024-03-21 PROCEDURE — BT141ZZ FLUOROSCOPY OF KIDNEYS, URETERS AND BLADDER USING LOW OSMOLAR CONTRAST: ICD-10-PCS | Performed by: UROLOGY

## 2024-03-21 PROCEDURE — 83605 ASSAY OF LACTIC ACID: CPT

## 2024-03-21 PROCEDURE — 84540 ASSAY OF URINE/UREA-N: CPT

## 2024-03-21 PROCEDURE — 2500000003 HC RX 250 WO HCPCS: Performed by: NURSE ANESTHETIST, CERTIFIED REGISTERED

## 2024-03-21 PROCEDURE — 6360000002 HC RX W HCPCS: Performed by: NURSE ANESTHETIST, CERTIFIED REGISTERED

## 2024-03-21 PROCEDURE — 7100000000 HC PACU RECOVERY - FIRST 15 MIN: Performed by: UROLOGY

## 2024-03-21 PROCEDURE — 99254 IP/OBS CNSLTJ NEW/EST MOD 60: CPT | Performed by: UROLOGY

## 2024-03-21 PROCEDURE — 2580000003 HC RX 258: Performed by: NURSE ANESTHETIST, CERTIFIED REGISTERED

## 2024-03-21 PROCEDURE — 2580000003 HC RX 258: Performed by: HOSPITALIST

## 2024-03-21 PROCEDURE — 2709999900 HC NON-CHARGEABLE SUPPLY: Performed by: UROLOGY

## 2024-03-21 PROCEDURE — 87086 URINE CULTURE/COLONY COUNT: CPT

## 2024-03-21 PROCEDURE — C1769 GUIDE WIRE: HCPCS | Performed by: UROLOGY

## 2024-03-21 PROCEDURE — 1200000000 HC SEMI PRIVATE

## 2024-03-21 PROCEDURE — 80053 COMPREHEN METABOLIC PANEL: CPT

## 2024-03-21 PROCEDURE — 6370000000 HC RX 637 (ALT 250 FOR IP): Performed by: UROLOGY

## 2024-03-21 PROCEDURE — C2617 STENT, NON-COR, TEM W/O DEL: HCPCS | Performed by: UROLOGY

## 2024-03-21 PROCEDURE — 2580000003 HC RX 258: Performed by: UROLOGY

## 2024-03-21 PROCEDURE — 74420 UROGRAPHY RTRGR +-KUB: CPT

## 2024-03-21 PROCEDURE — 94760 N-INVAS EAR/PLS OXIMETRY 1: CPT

## 2024-03-21 PROCEDURE — 3600000014 HC SURGERY LEVEL 4 ADDTL 15MIN: Performed by: UROLOGY

## 2024-03-21 PROCEDURE — 82570 ASSAY OF URINE CREATININE: CPT

## 2024-03-21 PROCEDURE — C1758 CATHETER, URETERAL: HCPCS | Performed by: UROLOGY

## 2024-03-21 PROCEDURE — 0T778DZ DILATION OF LEFT URETER WITH INTRALUMINAL DEVICE, VIA NATURAL OR ARTIFICIAL OPENING ENDOSCOPIC: ICD-10-PCS | Performed by: UROLOGY

## 2024-03-21 PROCEDURE — 6360000002 HC RX W HCPCS: Performed by: HOSPITALIST

## 2024-03-21 PROCEDURE — 74420 UROGRAPHY RTRGR +-KUB: CPT | Performed by: UROLOGY

## 2024-03-21 PROCEDURE — 93005 ELECTROCARDIOGRAM TRACING: CPT

## 2024-03-21 PROCEDURE — 52332 CYSTOSCOPY AND TREATMENT: CPT | Performed by: UROLOGY

## 2024-03-21 PROCEDURE — 81003 URINALYSIS AUTO W/O SCOPE: CPT

## 2024-03-21 PROCEDURE — 3700000001 HC ADD 15 MINUTES (ANESTHESIA): Performed by: UROLOGY

## 2024-03-21 PROCEDURE — 84300 ASSAY OF URINE SODIUM: CPT

## 2024-03-21 PROCEDURE — 3700000000 HC ANESTHESIA ATTENDED CARE: Performed by: UROLOGY

## 2024-03-21 PROCEDURE — 3600000004 HC SURGERY LEVEL 4 BASE: Performed by: UROLOGY

## 2024-03-21 PROCEDURE — 2580000003 HC RX 258: Performed by: ANESTHESIOLOGY

## 2024-03-21 PROCEDURE — 6370000000 HC RX 637 (ALT 250 FOR IP): Performed by: HOSPITALIST

## 2024-03-21 PROCEDURE — 36415 COLL VENOUS BLD VENIPUNCTURE: CPT

## 2024-03-21 PROCEDURE — 7100000001 HC PACU RECOVERY - ADDTL 15 MIN: Performed by: UROLOGY

## 2024-03-21 PROCEDURE — 6360000002 HC RX W HCPCS: Performed by: UROLOGY

## 2024-03-21 DEVICE — URETERAL STENT WITH SIDE HOLES 6FX28CM
Type: IMPLANTABLE DEVICE | Site: URETER | Status: FUNCTIONAL
Brand: TRIA™ SOFT

## 2024-03-21 RX ORDER — SODIUM CHLORIDE, SODIUM LACTATE, POTASSIUM CHLORIDE, CALCIUM CHLORIDE 600; 310; 30; 20 MG/100ML; MG/100ML; MG/100ML; MG/100ML
INJECTION, SOLUTION INTRAVENOUS CONTINUOUS
Status: DISCONTINUED | OUTPATIENT
Start: 2024-03-21 | End: 2024-03-21 | Stop reason: HOSPADM

## 2024-03-21 RX ORDER — ONDANSETRON 2 MG/ML
INJECTION INTRAMUSCULAR; INTRAVENOUS PRN
Status: DISCONTINUED | OUTPATIENT
Start: 2024-03-21 | End: 2024-03-21 | Stop reason: SDUPTHER

## 2024-03-21 RX ORDER — PROPOFOL 10 MG/ML
INJECTION, EMULSION INTRAVENOUS PRN
Status: DISCONTINUED | OUTPATIENT
Start: 2024-03-21 | End: 2024-03-21 | Stop reason: SDUPTHER

## 2024-03-21 RX ORDER — DEXAMETHASONE SODIUM PHOSPHATE 10 MG/ML
INJECTION, SOLUTION INTRAMUSCULAR; INTRAVENOUS PRN
Status: DISCONTINUED | OUTPATIENT
Start: 2024-03-21 | End: 2024-03-21 | Stop reason: SDUPTHER

## 2024-03-21 RX ORDER — KETOROLAC TROMETHAMINE 30 MG/ML
15 INJECTION, SOLUTION INTRAMUSCULAR; INTRAVENOUS ONCE
Status: COMPLETED | OUTPATIENT
Start: 2024-03-21 | End: 2024-03-21

## 2024-03-21 RX ORDER — ENOXAPARIN SODIUM 100 MG/ML
40 INJECTION SUBCUTANEOUS DAILY
Status: DISCONTINUED | OUTPATIENT
Start: 2024-03-22 | End: 2024-03-22 | Stop reason: HOSPADM

## 2024-03-21 RX ORDER — ROCURONIUM BROMIDE 10 MG/ML
INJECTION, SOLUTION INTRAVENOUS PRN
Status: DISCONTINUED | OUTPATIENT
Start: 2024-03-21 | End: 2024-03-21 | Stop reason: SDUPTHER

## 2024-03-21 RX ORDER — HYDROMORPHONE HYDROCHLORIDE 1 MG/ML
0.25 INJECTION, SOLUTION INTRAMUSCULAR; INTRAVENOUS; SUBCUTANEOUS EVERY 5 MIN PRN
Status: DISCONTINUED | OUTPATIENT
Start: 2024-03-21 | End: 2024-03-21 | Stop reason: HOSPADM

## 2024-03-21 RX ORDER — ERGOCALCIFEROL 1.25 MG/1
50000 CAPSULE ORAL WEEKLY
Status: DISCONTINUED | OUTPATIENT
Start: 2024-03-21 | End: 2024-03-22 | Stop reason: HOSPADM

## 2024-03-21 RX ORDER — LEVOFLOXACIN 500 MG/1
250 TABLET, FILM COATED ORAL DAILY
Status: DISCONTINUED | OUTPATIENT
Start: 2024-03-22 | End: 2024-03-22 | Stop reason: HOSPADM

## 2024-03-21 RX ORDER — SODIUM CHLORIDE, SODIUM LACTATE, POTASSIUM CHLORIDE, CALCIUM CHLORIDE 600; 310; 30; 20 MG/100ML; MG/100ML; MG/100ML; MG/100ML
INJECTION, SOLUTION INTRAVENOUS CONTINUOUS PRN
Status: DISCONTINUED | OUTPATIENT
Start: 2024-03-21 | End: 2024-03-21 | Stop reason: SDUPTHER

## 2024-03-21 RX ORDER — SODIUM CHLORIDE 0.9 % (FLUSH) 0.9 %
5-40 SYRINGE (ML) INJECTION EVERY 12 HOURS SCHEDULED
Status: DISCONTINUED | OUTPATIENT
Start: 2024-03-21 | End: 2024-03-21 | Stop reason: HOSPADM

## 2024-03-21 RX ORDER — HYDROMORPHONE HYDROCHLORIDE 1 MG/ML
0.5 INJECTION, SOLUTION INTRAMUSCULAR; INTRAVENOUS; SUBCUTANEOUS EVERY 5 MIN PRN
Status: DISCONTINUED | OUTPATIENT
Start: 2024-03-21 | End: 2024-03-21 | Stop reason: HOSPADM

## 2024-03-21 RX ORDER — ONDANSETRON 2 MG/ML
4 INJECTION INTRAMUSCULAR; INTRAVENOUS
Status: DISCONTINUED | OUTPATIENT
Start: 2024-03-21 | End: 2024-03-21 | Stop reason: HOSPADM

## 2024-03-21 RX ORDER — FENTANYL CITRATE 50 UG/ML
INJECTION, SOLUTION INTRAMUSCULAR; INTRAVENOUS PRN
Status: DISCONTINUED | OUTPATIENT
Start: 2024-03-21 | End: 2024-03-21 | Stop reason: SDUPTHER

## 2024-03-21 RX ORDER — LIDOCAINE HYDROCHLORIDE 10 MG/ML
INJECTION, SOLUTION EPIDURAL; INFILTRATION; INTRACAUDAL; PERINEURAL PRN
Status: DISCONTINUED | OUTPATIENT
Start: 2024-03-21 | End: 2024-03-21 | Stop reason: SDUPTHER

## 2024-03-21 RX ORDER — NALOXONE HYDROCHLORIDE 0.4 MG/ML
INJECTION, SOLUTION INTRAMUSCULAR; INTRAVENOUS; SUBCUTANEOUS PRN
Status: DISCONTINUED | OUTPATIENT
Start: 2024-03-21 | End: 2024-03-21 | Stop reason: HOSPADM

## 2024-03-21 RX ORDER — SODIUM CHLORIDE 9 MG/ML
INJECTION, SOLUTION INTRAVENOUS PRN
Status: DISCONTINUED | OUTPATIENT
Start: 2024-03-21 | End: 2024-03-21 | Stop reason: HOSPADM

## 2024-03-21 RX ORDER — HYDROCODONE BITARTRATE AND ACETAMINOPHEN 5; 325 MG/1; MG/1
1 TABLET ORAL EVERY 4 HOURS PRN
Status: DISCONTINUED | OUTPATIENT
Start: 2024-03-21 | End: 2024-03-22 | Stop reason: HOSPADM

## 2024-03-21 RX ORDER — SODIUM CHLORIDE 0.9 % (FLUSH) 0.9 %
5-40 SYRINGE (ML) INJECTION PRN
Status: DISCONTINUED | OUTPATIENT
Start: 2024-03-21 | End: 2024-03-21 | Stop reason: HOSPADM

## 2024-03-21 RX ORDER — LEVOFLOXACIN 5 MG/ML
500 INJECTION, SOLUTION INTRAVENOUS
Status: COMPLETED | OUTPATIENT
Start: 2024-03-21 | End: 2024-03-21

## 2024-03-21 RX ADMIN — SODIUM CHLORIDE: 9 INJECTION, SOLUTION INTRAVENOUS at 08:25

## 2024-03-21 RX ADMIN — HYDROMORPHONE HYDROCHLORIDE 1 MG: 1 INJECTION, SOLUTION INTRAMUSCULAR; INTRAVENOUS; SUBCUTANEOUS at 07:11

## 2024-03-21 RX ADMIN — PROPOFOL 150 MG: 10 INJECTION, EMULSION INTRAVENOUS at 14:46

## 2024-03-21 RX ADMIN — HYDROCODONE BITARTRATE AND ACETAMINOPHEN 1 TABLET: 5; 325 TABLET ORAL at 03:11

## 2024-03-21 RX ADMIN — LEVOFLOXACIN 500 MG: 5 INJECTION, SOLUTION INTRAVENOUS at 14:52

## 2024-03-21 RX ADMIN — SODIUM BICARBONATE 650 MG: 650 TABLET ORAL at 20:28

## 2024-03-21 RX ADMIN — DEXAMETHASONE SODIUM PHOSPHATE 10 MG: 10 INJECTION, SOLUTION INTRAMUSCULAR; INTRAVENOUS at 14:56

## 2024-03-21 RX ADMIN — FENTANYL CITRATE 50 MCG: 0.05 INJECTION, SOLUTION INTRAMUSCULAR; INTRAVENOUS at 14:40

## 2024-03-21 RX ADMIN — SODIUM CHLORIDE, SODIUM LACTATE, POTASSIUM CHLORIDE, AND CALCIUM CHLORIDE: 600; 310; 30; 20 INJECTION, SOLUTION INTRAVENOUS at 14:37

## 2024-03-21 RX ADMIN — ROCURONIUM BROMIDE 50 MG: 10 INJECTION, SOLUTION INTRAVENOUS at 14:46

## 2024-03-21 RX ADMIN — FENTANYL CITRATE 25 MCG: 0.05 INJECTION, SOLUTION INTRAMUSCULAR; INTRAVENOUS at 15:30

## 2024-03-21 RX ADMIN — ONDANSETRON 4 MG: 2 INJECTION INTRAMUSCULAR; INTRAVENOUS at 15:19

## 2024-03-21 RX ADMIN — HYDROMORPHONE HYDROCHLORIDE 1 MG: 1 INJECTION, SOLUTION INTRAMUSCULAR; INTRAVENOUS; SUBCUTANEOUS at 12:08

## 2024-03-21 RX ADMIN — LIDOCAINE HYDROCHLORIDE 50 MG: 10 INJECTION, SOLUTION EPIDURAL; INFILTRATION; INTRACAUDAL; PERINEURAL at 14:37

## 2024-03-21 RX ADMIN — SODIUM CHLORIDE, SODIUM LACTATE, POTASSIUM CHLORIDE, AND CALCIUM CHLORIDE: 600; 310; 30; 20 INJECTION, SOLUTION INTRAVENOUS at 13:45

## 2024-03-21 RX ADMIN — FENTANYL CITRATE 25 MCG: 0.05 INJECTION, SOLUTION INTRAMUSCULAR; INTRAVENOUS at 15:17

## 2024-03-21 RX ADMIN — SUGAMMADEX 200 MG: 100 INJECTION, SOLUTION INTRAVENOUS at 15:20

## 2024-03-21 RX ADMIN — KETOROLAC TROMETHAMINE 15 MG: 30 INJECTION, SOLUTION INTRAMUSCULAR; INTRAVENOUS at 16:12

## 2024-03-21 RX ADMIN — SODIUM CHLORIDE, PRESERVATIVE FREE 10 ML: 5 INJECTION INTRAVENOUS at 20:28

## 2024-03-21 ASSESSMENT — ENCOUNTER SYMPTOMS
RESPIRATORY NEGATIVE: 1
GASTROINTESTINAL NEGATIVE: 1
EYES NEGATIVE: 1

## 2024-03-21 ASSESSMENT — PAIN DESCRIPTION - LOCATION
LOCATION: FLANK

## 2024-03-21 ASSESSMENT — PAIN DESCRIPTION - PAIN TYPE: TYPE: ACUTE PAIN

## 2024-03-21 ASSESSMENT — PAIN DESCRIPTION - ORIENTATION
ORIENTATION: LEFT

## 2024-03-21 ASSESSMENT — PAIN DESCRIPTION - DESCRIPTORS
DESCRIPTORS: SORE
DESCRIPTORS: STABBING
DESCRIPTORS: ACHING

## 2024-03-21 ASSESSMENT — PAIN SCALES - GENERAL
PAINLEVEL_OUTOF10: 7
PAINLEVEL_OUTOF10: 2
PAINLEVEL_OUTOF10: 0
PAINLEVEL_OUTOF10: 7
PAINLEVEL_OUTOF10: 7

## 2024-03-21 ASSESSMENT — PAIN DESCRIPTION - ONSET: ONSET: ON-GOING

## 2024-03-21 ASSESSMENT — PAIN DESCRIPTION - FREQUENCY: FREQUENCY: CONTINUOUS

## 2024-03-21 ASSESSMENT — PAIN - FUNCTIONAL ASSESSMENT
PAIN_FUNCTIONAL_ASSESSMENT: ACTIVITIES ARE NOT PREVENTED
PAIN_FUNCTIONAL_ASSESSMENT: FACE, LEGS, ACTIVITY, CRY, AND CONSOLABILITY (FLACC)
PAIN_FUNCTIONAL_ASSESSMENT: ACTIVITIES ARE NOT PREVENTED

## 2024-03-21 ASSESSMENT — LIFESTYLE VARIABLES: SMOKING_STATUS: 0

## 2024-03-21 NOTE — CONSULTS
Consult Note            Date:3/21/2024        Patient Name:Chris Velez     YOB: 1965     Age:58 y.o.    Inpatient consult to Urology  Consult performed by: Justo Hernandez MD  Consult ordered by: Melanie Thomas MD  Reason for consult: Left flank pain, left hydronephrosis after urologic instrumentation, RANDALL          Chief Complaint   No chief complaint on file.     ”I have been having left flank pain”    History Obtained From   patient, electronic medical record    History of Present Illness   Patient 58-year-old gentleman who underwent a urologic procedure on 2/29/2024 for left ureteral calculus by Dr. Lord do not have the operative report available but apparently had a basket extraction and stent placement.  The patient reports there was 2 different stones he could not get the other stone on initial procedure and was taken back to the operating room on 3/8/2024 and underwent laser lithotripsy and stent placement.  Appears he has normal renal function at baseline but a creatinine at that time was 1.4 GFR 58.  His stent was left in place until 3/11/2024 and a CT scan at that time showed no stones he does have peripelvic cyst [place] was difficult to discern hydronephrosis but the stent was removed after about 4 days according to the patient.  He then presented to [facility] on 3/17/2024 complaining of worsening left flank pain and fever up to 102.  CT scan at that time showed again peripelvic cyst unfortunately this was without urogram protocol delayed imaging.  There is no significant changes of the peripelvic cyst left-sided stent has been removed there is fat stranding surrounding the left kidney and proximal left ureter no evidence of hydronephrosis of both kidneys enhance symmetrically and no stones were seen.  There was some concerns based on radiographic findings that he may have pyelonephritis so he has been on antibiotics.   He is found at that time to  have creatinine up to 1.6 white blood cell count was 10.5.  Urine and blood cultures were obtained which were no growth but he would been on antibiotic.  Urinalysis was negative for infection.  He continued to have left flank pain follow-up imaging. because of continued pain and worsening creatinine up to 1.7 a CT scan was done yesterday 3/20/2024 which shows worsening dilation of the left kidney therefore I was contacted through the transfer center to transfer the patient here for retrograde and stent placement.     Past Medical History     Past Medical History:   Diagnosis Date    GERD (gastroesophageal reflux disease)     Hypertension     Pancreatitis         Past Surgical History     Past Surgical History:   Procedure Laterality Date    CHOLECYSTECTOMY      CYSTOSCOPY W/ URETERAL STENT PLACEMENT Left     UPPER GASTROINTESTINAL ENDOSCOPY N/A 03/18/2020    Dr MARIA Valladares-w/EUS-No obvious cause for pancreatitis currently        Medications     Prior to Admission medications    Medication Sig Start Date End Date Taking? Authorizing Provider   lisinopril (PRINIVIL;ZESTRIL) 10 MG tablet Take 1 tablet by mouth daily   Yes Vielka Kaba MD   omeprazole (PRILOSEC) 20 MG delayed release capsule Take 1 capsule by mouth Daily 1/29/20   ProviderVielka MD        vitamin D (ERGOCALCIFEROL) capsule 50,000 Units, Weekly  sodium chloride flush 0.9 % injection 5-40 mL, 2 times per day  sodium chloride flush 0.9 % injection 5-40 mL, PRN  0.9 % sodium chloride infusion, PRN  potassium chloride (KLOR-CON M) extended release tablet 40 mEq, PRN   Or  potassium bicarb-citric acid (EFFER-K) effervescent tablet 40 mEq, PRN   Or  potassium chloride 10 mEq/100 mL IVPB (Peripheral Line), PRN  magnesium sulfate 2000 mg in 50 mL IVPB premix, PRN  [Held by provider] enoxaparin (LOVENOX) injection 40 mg, Daily  ondansetron (ZOFRAN-ODT) disintegrating tablet 4 mg, Q8H PRN   Or  ondansetron (ZOFRAN) injection 4 mg, Q6H PRN  polyethylene

## 2024-03-21 NOTE — PROGRESS NOTES
[facility]   Pharmacy Pharmacokinetic Monitoring Service - Vancomycin     Chris Velez is a 58 y.o. male starting on vancomycin therapy for sepsis / UTI. Pharmacy consulted by Dr. Thomas for monitoring and adjustment.    Target Concentration: Goal AUC/ILDA 400-600 mg*hr/L    Additional Antimicrobials: cipro    Pertinent Laboratory Values:   Wt Readings from Last 1 Encounters:   03/20/24 99.8 kg (220 lb)     Temp Readings from Last 1 Encounters:   09/08/20 98 °F (36.7 °C)     CrCl cannot be calculated (Patient's most recent lab result is older than the maximum 30 days allowed.).  No results for input(s): \"CREATININE\", \"BUN\", \"WBC\" in the last 72 hours.  Procalcitonin: No level    Pertinent Cultures:  Culture Date Source Results    Blood Sent   MRSA Nasal Swab: N/A. Non-respiratory infection.    Plan:  Dosing recommendations based on Bayesian software  Start vancomycin 2500mg IV x 1 dose. Will schedule future doses once labs back and know current renal function  Renal labs as indicated   Vancomycin concentration to be ordered    Pharmacy will continue to monitor patient and adjust therapy as indicated    Thank you for the consult,  WILLIAMS FAROOQ, PHARM D, 3/20/2024, 8:40 PM

## 2024-03-21 NOTE — PROGRESS NOTES
[facility]    Progress Note    Patient:  Chris Velez  YOB: 1965  Date of Service: 3/21/2024  MRN: [id number]   Acct: [id number]   Primary Care Physician: Oren Allen DO  Advance Directive: Full Code  Admit Date: 3/20/2024       Hospital Day: 1    Portions of this note have been copied forward, however, updated to reflect the most current clinical status of this patient.     CHIEF COMPLAINT: flank pain    CUMULATIVE HOSPITAL COURSE:     This patient is a 58-year-old male with PMH GERD and hypertension who was transferred from [facility] on 3/20 for urology consultation due to RANDALL, left hydronephrosis, and flank pain after urologic instrumentation at [facility] on 2/29 and 3/8.  Patient presented back to [facility] on 3/17 and was found to be in acute renal failure with creatinine of 1.7; he was admitted, received aggressive hydration, pain control, and IV antibiotics.  A CT was performed on 3/20 which showed perinephritic and periureteral stranding, left hydronephrosis, possible UPJ obstruction.  His blood cultures and urine culture at that facility were negative.  Plan is for cystoscopy, retrograde pyelogram and left ureteral stent placement per urology on 3/21.  His creatinine remains elevated at 1.5, otherwise lab work is unremarkable.    Objective   VITALS:  BP (!) 138/90   Pulse 77   Temp 98.4 °F (36.9 °C) (Temporal)   Resp 12   Ht 1.905 m (6' 3\")   Wt 99.8 kg (220 lb)   SpO2 97%   BMI 27.50 kg/m²     24HR INTAKE/OUTPUT:    Intake/Output Summary (Last 24 hours) at 3/21/2024 1123  Last data filed at 3/21/2024 0708  Gross per 24 hour   Intake 574.74 ml   Output 1200 ml   Net -625.26 ml       Physical Exam  Constitutional:       Appearance: Normal appearance. He is normal weight.   Cardiovascular:      Rate and Rhythm: Normal rate and regular rhythm.      Pulses: Normal pulses.   Pulmonary:      Effort: Pulmonary effort is normal.      Breath sounds: Normal breath sounds.   Abdominal:       for input(s): \"LABA1C\" in the last 72 hours.    ABG:No results for input(s): \"PHART\", \"IKG4ITJ\", \"PO2ART\", \"QQU3LAZ\", \"BEART\", \"HGBAE\", \"W7BRBBTW\", \"CARBOXHGBART\" in the last 72 hours.    Rad:   XR ABDOMEN (KUB) (SINGLE AP VIEW)    Result Date: 3/21/2024    EXAM:  KUB  HISTORY: Abdominal pain.  COMPARISON:  CT abdomen pelvis 09/08/2020  FINDINGS: There is scattered gas and stool throughout the bowel in the abdomen pelvis.  There is no pneumatosis, portal venous gas or free air.  There are surgical clips in right upper quadrant.  The osseous structures are unremarkable.      Nonobstructive bowel gas pattern.    ______________________________________ Electronically signed by: SEBASTIÁN HIRSCH M.D. Date:     03/21/2024 Time:    07:26       Culture Results:    No results for input(s): \"CXSURG\" in the last 720 hours.    Blood Culture Recent: No results for input(s): \"BC\" in the last 720 hours.    No results for input(s): \"BC\", \"BLOODCULT2\", \"ORG\" in the last 72 hours.    Assessment/Plan:   Hydronephrosis  -Recent instrumentation at [facility]  -Plan for cystoscopy, retrograde pyelogram, left ureteral stent placement on 3/21  -Pyelonephritis felt unlikely per urology    RANDALL (acute kidney injury) (HCC)  -Creatinine 1.5  -Continue maintenance IVF    HTN (hypertension)  -Vitals per routine    Constipation  -Bowel regimen  -Movantik, colace    DVT Prophylaxis: Lovenox, currently on hold     GI prophylaxis:  Protonix     Discharge planning:  TBD     Advance Directive: Full Code    Diet: Diet NPO Exceptions are: Ice Chips, Sips of Water with Meds     Consults Made:   IP CONSULT TO UROLOGY  PHARMACY TO DOSE VANCOMYCIN    Further Orders per Clinical course/attending.     EMR Dragon/Transcription disclaimer:   Much of this encounter note is an electronic transcription/translation of spoken language to printed text. The electronic translation of spoken language may permit erroneous, or at times, nonsensical words or phrases to be

## 2024-03-21 NOTE — ANESTHESIA POSTPROCEDURE EVALUATION
Department of Anesthesiology  Postprocedure Note    Patient: Chris Velez  MRN: [id number]  YOB: 1965  Date of evaluation: 3/21/2024    Procedure Summary       Date: 03/21/24 Room / Location: [facility]    Anesthesia Start: 1437 Anesthesia Stop: 1533    Procedure: CYSTOSCOPY; BILATERAL URETAL CATHETERIZATION; RETROGRADE PYELOGRAMS; LEFT URETERAL STENT PLACEMENT 6F X 28 CM (Bilateral: Ureter) Diagnosis:       Left flank pain      Hydronephrosis, left      (Left flank pain [R10.9])      (Hydronephrosis, left [N13.30])    Surgeons: Justo Hernandez MD Responsible Provider: Chu Barreto APRN - CRNA    Anesthesia Type: general ASA Status: 2            Anesthesia Type: No value filed.    Viktor Phase I: Viktor Score: 10    Viktor Phase II:      Anesthesia Post Evaluation    Patient location during evaluation: PACU  Patient participation: complete - patient participated  Level of consciousness: awake and alert  Pain score: 0  Airway patency: patent  Nausea & Vomiting: no nausea and no vomiting  Cardiovascular status: hemodynamically stable  Respiratory status: spontaneous ventilation, nonlabored ventilation and room air  Hydration status: stable  Multimodal analgesia pain management approach    No notable events documented.

## 2024-03-21 NOTE — OP NOTE
Brief Operative Note      Patient: Chris Velez  YOB: 1965  MRN: [id number]    Date of Procedure: 3/21/2024    Pre-Op Diagnosis Codes:     * Left flank pain [R10.9]     * Hydronephrosis, left [N13.30]     * Peripelvic (lymphatic) cyst [N28.1]    Post-Op Diagnosis: Same       Procedure(s):  CYSTOSCOPY; BILATERAL URETAL CATHETERIZATION;  BILATERAL RETROGRADE PYELOGRAMS; LEFT URETERAL STENT PLACEMENT 6F X 28 CM Tria soft     Surgeon(s):  Justo Hernandez MD    Assistant:   * No surgical staff found *    Anesthesia: General    Estimated Blood Loss (mL): 0    Complications: None    Specimens:   ID Type Source Tests Collected by Time Destination   1 : URINE FROM CYSTOSCOPY Urine Bladder CULTURE, URINE Justo Hernandez MD 3/21/2024 1516        Implants:  Implant Name Type Inv. Item Serial No.  Lot No. LRB No. Used Action   STENT URETERAL 6 FRX28 CM SOFT MONOFILAMENT TRIA - [id number]  STENT URETERAL 6 FRX28 CM SOFT MONOFILAMENT TRIA  [employer] UROLOGY-[id number] Left 1 Implanted         Drains: * No LDAs found *    Findings: Cystoscopy shows normal-appearing urethra.  Normal prostate no significant prostate enlargement or obstruction.  Bladder appears normal no mucosal abnormalities stones or foreign bodies.    Left retrograde pyelogram shows normal left ureter.  There is some mild kinking or tortuosity of the left proximal ureter at the UPJ without UPJ obstruction.  No significant hydronephrosis or dilation of the renal pelvis.  Elongated infundibulum consistent with external compression from peripelvic cyst.  Postinjection imaging shows contrast draining from the left renal pelvis and ureter without obstruction.  The upper pole calyx appear to be dilated with some debris in the calyx probably from prior instrumentation prior obstruction from stone.  A 6 Pashto by 28 cm Tria soft ureteral stent was placed with a coil in the upper pole calyx.  String was attached.  There was E flux of some  cloudy debris after stent placement.  I collected this through the cystoscope sheath to send for culture.  This was not grossly purulent however I reversed my recommendation for antibiotic coverage given these findings and would recommend continuing antibiotics while stent is in place and  for a few days after removal.    Normal right retrograde pyelogram without obstruction or hydronephrosis.  Elongated infundibulum with some extrinsic compression on the infundibulum and renal pelvis consistent with peripelvic cyst on the right as well.  No obstruction postinjection imaging shows good drainage of contrast.        Detailed Description of Procedure:   See dictated report:  [id number]    Disposition to PACU then back to fifth floor.  Observe overnight.  If remains afebrile and has improvement with creatinine may dismiss tomorrow or Saturday follow-up in my office in 7 days for stent removal he does have a string.  Continue antibiotics for 10 days.  Follow-up on cultures    Electronically signed by ARA CARBALLO MD on 3/21/2024 at 3:25 PM

## 2024-03-21 NOTE — ANESTHESIA PRE PROCEDURE
Problem List:    Patient Active Problem List   Diagnosis Code   • Hydronephrosis N13.30   • RANDALL (acute kidney injury) (HCC) N17.9   • HTN (hypertension) I10   • Constipation K59.00       Past Medical History:        Diagnosis Date   • GERD (gastroesophageal reflux disease)    • Hypertension    • Pancreatitis        Past Surgical History:        Procedure Laterality Date   • CHOLECYSTECTOMY     • CYSTOSCOPY W/ URETERAL STENT PLACEMENT Left    • UPPER GASTROINTESTINAL ENDOSCOPY N/A 2020    Dr MARIA Valladares-w/EUS-No obvious cause for pancreatitis currently       Social History:    Social History     Tobacco Use   • Smoking status: Former     Current packs/day: 0.00     Average packs/day: 0.5 packs/day for 15.0 years (7.5 ttl pk-yrs)     Types: Cigarettes     Start date: 1981     Quit date: 1996     Years since quittin.1   • Smokeless tobacco: Never   Substance Use Topics   • Alcohol use: Not Currently     Alcohol/week: 1.0 standard drink of alcohol     Types: 1 Shots of liquor per week     Comment: social                                Counseling given: Not Answered      Vital Signs (Current):   Vitals:    24 0341 24 0405 24 0811 24 1208   BP:  124/78 (!) 138/90    Pulse:  84 77    Resp: 17 18 12 16   Temp:  97.5 °F (36.4 °C) 98.4 °F (36.9 °C)    TempSrc:  Temporal Temporal    SpO2:  94% 97%    Weight:       Height:                                                  BP Readings from Last 3 Encounters:   24 (!) 138/90   20 (!) 147/88   20 100/64       NPO Status:                                                                                 BMI:   Wt Readings from Last 3 Encounters:   24 99.8 kg (220 lb)   20 102.1 kg (225 lb)   20 95.3 kg (210 lb)     Body mass index is 27.5 kg/m².    CBC:   Lab Results   Component Value Date/Time    WBC 8.9 2024 03:12 AM    RBC 4.38 2024 03:12 AM    HGB 12.7 2024 03:12 AM    HCT 37.8

## 2024-03-21 NOTE — PROGRESS NOTES
4 Eyes Skin Assessment     NAME:  Chris Velez  YOB: 1965  MEDICAL RECORD NUMBER:  [id number]    The patient is being assessed for  Admission    I agree that at least one RN has performed a thorough Head to Toe Skin Assessment on the patient. ALL assessment sites listed below have been assessed.      Areas assessed by both nurses:    Head, Face, Ears, Shoulders, Back, Chest, Arms, Elbows, Hands, Sacrum. Buttock, Coccyx, Ischium, and Legs. Feet and Heels        Does the Patient have a Wound? No noted wound(s)       Davion Prevention initiated by RN: No  Wound Care Orders initiated by RN: No    Pressure Injury (Stage 3,4, Unstageable, DTI, NWPT, and Complex wounds) if present, place Wound referral order by RN under : No    New Ostomies, if present place, Ostomy referral order under : No     Nurse 1 eSignature: Electronically signed by Pamela Hummel RN on 3/20/24 at 10:51 PM CDT    **SHARE this note so that the co-signing nurse can place an eSignature**    Nurse 2 eSignature: Electronically signed by Celina Gomes RN on 3/20/24 at 11:09 PM CDT

## 2024-03-21 NOTE — PROGRESS NOTES
[facility]   Pharmacy Pharmacokinetic Monitoring Service - Vancomycin    Consulting Provider: Dr Thomas   Indication: Sepsis/UTI  Target Concentration:  Tr: 15-20  Day of Therapy: 1  Additional Antimicrobials: Cipro    Pertinent Laboratory Values:   Wt Readings from Last 1 Encounters:   03/20/24 99.8 kg (220 lb)     Temp Readings from Last 1 Encounters:   03/20/24 99 °F (37.2 °C) (Temporal)     Estimated Creatinine Clearance: 74 mL/min (A) (based on SCr of 1.3 mg/dL (H)).  Recent Labs     03/20/24 2056   CREATININE 1.3*   BUN 15   WBC 7.4     Procalcitonin: N/A    Pertinent Cultures: No current cultures at this time  Culture Date Source Results        MRSA Nasal Swab: N/A. Non-respiratory infection.    Recent vancomycin administrations        No vancomycin IV orders with administrations found.            Orders not given:            vancomycin (VANCOCIN) 2,500 mg in sodium chloride 0.9 % 500 mL IVPB    vancomycin (VANCOCIN) intermittent dosing (placeholder)                      Plan:  Continue current dosing regimen   Pharmacy will continue to monitor patient and adjust therapy as indicated  Repeat vancomycin concentration ordered for 03/22 @ 0200  Will start Vancomycin as Pulse Dosing due to an increase in patients renal function. Will start on a scheduled dose once renal function improves.    Thank you for the consult,  Kory Stanton RPH  3/20/2024 10:32 PM

## 2024-03-21 NOTE — H&P
[facility]      Hospitalist - History & Physical      PCP: Alejandro Oren DO [place]    Date of Admission: 3/20/2024    Date of Service: 3/20/2024    Chief Complaint: Flank pain    History Of Present Illness:   The patient is a 58 y.o. male with GERD, HTN, complaining of flank pain. Patient has had a history of left ureteroscopic basket extraction with left urethral stone with cystoscopy and left double-J stent placement 2/29/2024 by .  Then s/p lithotripsy of left urethral stone with cystoscopy and left double-J stent removal on replacement on 3/8.  He returned to [facility] on 3/17 due to flank pain and found to be in acute renal failure.  Endorses fever 100.4, left-sided flank pain, decreased appetite and oral intake, weakness and fatigue.  States last bowel movement was on Sunday.  Denies nausea, vomiting, shortness of breath, chest pain.  Received aggressive hydration, pain control, and IV antibiotics. Workup at OSH 3/20 CT abdomen pelvis nonobstructing upper pole nephrolithiasis right kidney, suspected combination of parapelvic cyst and UPJ obstruction etiology unclear with associated perinephric and periurethral stranding,  5K4.3, creatinine 1.7 BUN 16 mag 1.9, WBC 8.3 Hgb 11.5 HCT 35.43/19 renal ultrasound moderate bilateral hydronephrosis, bilateral renal cysts, blood cultures negative urinalysis negative.  CT abdomen results reviewed at OSH no urology coverage patient was transferred for higher level of care.  Patient is to be admitted to the hospital service with consultation to urology.  Past Medical History:        Diagnosis Date   • GERD (gastroesophageal reflux disease)    • Hypertension    • Pancreatitis        Past Surgical History:        Procedure Laterality Date   • CHOLECYSTECTOMY     • CYSTOSCOPY W/ URETERAL STENT PLACEMENT Left    • UPPER GASTROINTESTINAL ENDOSCOPY N/A 03/18/2020    Dr MARIA Valladares-w/EUS-No obvious cause for pancreatitis currently 
hypotension              - Avoid nephrotoxic agents    HTN (hypertension)   -Currently 141/91   -As needed antihypertensive   -Monitor vital signs  Constipation   -KUB   -Scheduled bowel regimen    DVT prophylaxis Lovenox    Signed:  MINERVA Aragon - CNP, 3/20/2024 8:48 PM    EMR Dragon/Transcription disclaimer:   Much of this encounter note is an electronic transcription/translation of spoken language to printed text. The electronic translation of spoken language may permit erroneous, or at times, nonsensical words or phrases to be inadvertently transcribed; although attempts have made to review the note for such errors, some may still exist.

## 2024-03-22 ENCOUNTER — TELEPHONE (OUTPATIENT)
Dept: UROLOGY | Age: 59
End: 2024-03-22

## 2024-03-22 VITALS
HEART RATE: 71 BPM | OXYGEN SATURATION: 100 % | SYSTOLIC BLOOD PRESSURE: 122 MMHG | RESPIRATION RATE: 16 BRPM | TEMPERATURE: 97.3 F | BODY MASS INDEX: 27.35 KG/M2 | WEIGHT: 220 LBS | DIASTOLIC BLOOD PRESSURE: 76 MMHG | HEIGHT: 75 IN

## 2024-03-22 LAB
ALBUMIN SERPL-MCNC: 3.2 G/DL (ref 3.5–5.2)
ALP SERPL-CCNC: 152 U/L (ref 40–130)
ALT SERPL-CCNC: 17 U/L (ref 5–41)
ANION GAP SERPL CALCULATED.3IONS-SCNC: 13 MMOL/L (ref 7–19)
AST SERPL-CCNC: 11 U/L (ref 5–40)
BILIRUB SERPL-MCNC: 0.3 MG/DL (ref 0.2–1.2)
BUN SERPL-MCNC: 19 MG/DL (ref 6–20)
CALCIUM SERPL-MCNC: 8.8 MG/DL (ref 8.6–10)
CHLORIDE SERPL-SCNC: 104 MMOL/L (ref 98–111)
CO2 SERPL-SCNC: 23 MMOL/L (ref 22–29)
CREAT SERPL-MCNC: 1.1 MG/DL (ref 0.5–1.2)
ERYTHROCYTE [DISTWIDTH] IN BLOOD BY AUTOMATED COUNT: 10.5 % (ref 11.5–14.5)
GLUCOSE SERPL-MCNC: 165 MG/DL (ref 74–109)
HCT VFR BLD AUTO: 39.2 % (ref 42–52)
HGB BLD-MCNC: 12.8 G/DL (ref 14–18)
MCH RBC QN AUTO: 29.3 PG (ref 27–31)
MCHC RBC AUTO-ENTMCNC: 32.7 G/DL (ref 33–37)
MCV RBC AUTO: 89.7 FL (ref 80–94)
PLATELET # BLD AUTO: 363 K/UL (ref 130–400)
PMV BLD AUTO: 9.8 FL (ref 9.4–12.4)
POTASSIUM SERPL-SCNC: 4.8 MMOL/L (ref 3.5–5)
PROT SERPL-MCNC: 6.8 G/DL (ref 6.6–8.7)
RBC # BLD AUTO: 4.37 M/UL (ref 4.7–6.1)
SODIUM SERPL-SCNC: 140 MMOL/L (ref 136–145)
WBC # BLD AUTO: 6.1 K/UL (ref 4.8–10.8)

## 2024-03-22 PROCEDURE — 36415 COLL VENOUS BLD VENIPUNCTURE: CPT

## 2024-03-22 PROCEDURE — 85027 COMPLETE CBC AUTOMATED: CPT

## 2024-03-22 PROCEDURE — 80053 COMPREHEN METABOLIC PANEL: CPT

## 2024-03-22 PROCEDURE — 94760 N-INVAS EAR/PLS OXIMETRY 1: CPT

## 2024-03-22 PROCEDURE — 6370000000 HC RX 637 (ALT 250 FOR IP): Performed by: UROLOGY

## 2024-03-22 RX ORDER — POLYETHYLENE GLYCOL 3350 17 G/17G
17 POWDER, FOR SOLUTION ORAL DAILY PRN
Qty: 527 G | Refills: 0 | Status: SHIPPED | OUTPATIENT
Start: 2024-03-22 | End: 2024-03-28

## 2024-03-22 RX ORDER — LEVOFLOXACIN 250 MG/1
250 TABLET, FILM COATED ORAL DAILY
Qty: 9 TABLET | Refills: 0 | Status: SHIPPED | OUTPATIENT
Start: 2024-03-22 | End: 2024-03-28

## 2024-03-22 RX ORDER — PSEUDOEPHEDRINE HCL 30 MG
100 TABLET ORAL DAILY
Qty: 30 CAPSULE | Refills: 0 | Status: SHIPPED | OUTPATIENT
Start: 2024-03-22 | End: 2024-03-28

## 2024-03-22 RX ADMIN — SODIUM BICARBONATE 650 MG: 650 TABLET ORAL at 09:13

## 2024-03-22 RX ADMIN — LEVOFLOXACIN 250 MG: 500 TABLET, FILM COATED ORAL at 09:13

## 2024-03-22 RX ADMIN — NALOXEGOL OXALATE 12.5 MG: 12.5 TABLET, FILM COATED ORAL at 06:24

## 2024-03-22 RX ADMIN — PANTOPRAZOLE SODIUM 20 MG: 20 TABLET, DELAYED RELEASE ORAL at 06:24

## 2024-03-22 RX ADMIN — ERGOCALCIFEROL 50000 UNITS: 1.25 CAPSULE ORAL at 09:13

## 2024-03-22 ASSESSMENT — PAIN SCALES - GENERAL: PAINLEVEL_OUTOF10: 0

## 2024-03-22 NOTE — PROGRESS NOTES
CLINICAL PHARMACY NOTE: MEDS TO BEDS    Total # of Prescriptions Filled: 4   The following medications were delivered to the patient:  Current Discharge Medication List        START taking these medications    Details   levoFLOXacin (LEVAQUIN) 250 MG tablet Take 1 tablet by mouth daily for 9 doses  Qty: 9 tablet, Refills: 0      hyoscyamine (LEVSIN/SL) 125 MCG sublingual tablet Place 1 tablet under the tongue every 4 hours as needed for Cramping (bladder spasm)  Qty: 12 tablet, Refills: 0      docusate sodium (COLACE, DULCOLAX) 100 MG CAPS Take 100 mg by mouth daily  Qty: 30 capsule, Refills: 0      polyethylene glycol (GLYCOLAX) 17 g packet Take 1 packet by mouth daily as needed for Constipation  Qty: 527 g, Refills: 0               Additional Documentation:  Delivered to patients room and handed to patient. Paid with card

## 2024-03-22 NOTE — DISCHARGE INSTR - DIET

## 2024-03-22 NOTE — PROGRESS NOTES
Chris Velez is a 58 y.o. male patient.    Current Facility-Administered Medications   Medication Dose Route Frequency Provider Last Rate Last Admin    vitamin D (ERGOCALCIFEROL) capsule 50,000 Units  50,000 Units Oral Weekly Justo Hernandez MD   50,000 Units at 03/22/24 0913    enoxaparin (LOVENOX) injection 40 mg  40 mg SubCUTAneous Daily Justo Hernandez MD        HYDROcodone-acetaminophen (NORCO) 5-325 MG per tablet 1 tablet  1 tablet Oral Q4H PRN Justo Hernandez MD        levoFLOXacin (LEVAQUIN) tablet 250 mg  250 mg Oral Daily Justo Hernandez MD   250 mg at 03/22/24 0913    hyoscyamine (LEVSIN/SL) sublingual tablet 125 mcg  125 mcg SubLINGual Q4H PRN Justo Hernandez MD        sodium chloride flush 0.9 % injection 5-40 mL  5-40 mL IntraVENous 2 times per day Justo Hernandez MD   10 mL at 03/21/24 2028    sodium chloride flush 0.9 % injection 5-40 mL  5-40 mL IntraVENous PRN Justo Hernandez MD        0.9 % sodium chloride infusion   IntraVENous PRN Justo Hernandez MD        potassium chloride (KLOR-CON M) extended release tablet 40 mEq  40 mEq Oral PRN Justo Hernandez MD        Or    potassium bicarb-citric acid (EFFER-K) effervescent tablet 40 mEq  40 mEq Oral PRN Justo Hernandez MD        Or    potassium chloride 10 mEq/100 mL IVPB (Peripheral Line)  10 mEq IntraVENous PRN Justo Hernandez MD        magnesium sulfate 2000 mg in 50 mL IVPB premix  2,000 mg IntraVENous PRN Justo Hernandez MD        ondansetron (ZOFRAN-ODT) disintegrating tablet 4 mg  4 mg Oral Q8H PRN Justo Hernandez MD        Or    ondansetron (ZOFRAN) injection 4 mg  4 mg IntraVENous Q6H PRN Justo Hernandez MD        polyethylene glycol (GLYCOLAX) packet 17 g  17 g Oral Daily PRN Justo Hernandez MD        acetaminophen (TYLENOL) tablet 650 mg  650 mg Oral Q6H PRN Justo Hernandez MD        Or    acetaminophen (TYLENOL) suppository 650 mg  650 mg Rectal Q6H PRN Justo Hernandez MD

## 2024-03-22 NOTE — PROCEDURES
[facility]              [address] [place] [address]                             PROCEDURE NOTE      PATIENT NAME: SUHAS HANEY                  : 1965  MED REC NO: [id number]                          ROOM: [id number]  ACCOUNT NO: [id number]                       ADMIT DATE: 2024  PROVIDER: Justo Hernandez MD      DATE OF PROCEDURE:  2024    SURGEON:  Justo Hernandez MD    PROCEDURE:  Radiographic interpretation of bilateral retrograde pyelograms.    INTERPRETATION:    1. Left retrograde pyelogram:  Interpretation of left retrograde pyelogram shows normal left ureter.  There is some mild kinking of the left proximal ureter at the level of the UPJ without UPJ stenosis or obstruction.  There is no significant hydronephrosis or dilation of the renal pelvis.  There is elongation of the infundibulum which is consistent with extrinsic compression from known peripelvic cysts.  Post-injection imaging shows contrast draining from the left renal pelvis and ureter without obstruction.  There is dilation of the upper pole calyx with some irregular filling consistent with some debris in this calyx.     a. Conclusion:  Normal-appearing left ureter.  No significant UPJ obstruction, no hydronephrosis.  However, there was dilation of the upper pole calyx, likely secondary to extrinsic compression from peripelvic cysts on the elongated infundibulum.  This does create some infundibulum narrowing.  The mid and lower poles of the infundibulum also elongated, but not as narrowed.  There is some retained contrast in the upper pole dilated calyx.  Otherwise, no other obstruction seen on postcontrast injection and drainage films.  2. Right retrograde pyelogram.  Interpretation of right retrograde pyelogram shows normal-appearing right ureter without obstruction or filling defects.  There is no hydronephrosis.  There is no dilation of the right renal pelvis.  There is elongated  infundibulum without dilation of the calices.  This is consistent with extrinsic compression of the infundibulum and renal pelvis from peripelvic cysts known also to exist on the right.  There is no obstruction on post-injection imaging showing good drainage of contrast.     a. Conclusion:  Normal right retrograde pyelogram without hydronephrosis.  Elongated infundibulum of the kidney consistent with extrinsic compression without obstruction of known peripelvic cysts.  No evidence of obstruction on post drain imaging.          ARA CARBALLO MD      D:  03/21/2024 16:48:02     T:  03/21/2024 21:55:47     MATT/[place]  Job #:  [id number]     Doc#:  [id number]

## 2024-03-22 NOTE — DISCHARGE SUMMARY
[facility]    Discharge Summary      Chris Velez  :  1965  MRN:  [id number]    Admit date:  3/20/2024  Discharge date:    3/22/2024    Discharging Physician:  Dr. Wells    Advance Directive: Full Code    Consults: urology    Primary Care Physician:  Oren Allen, DO    Discharge Diagnoses:  Principal Problem:    Hydronephrosis  Active Problems:    RANDALL (acute kidney injury) (HCC)    HTN (hypertension)    Constipation    Left flank pain    Peripelvic (lymphatic) cyst  Resolved Problems:    * No resolved hospital problems. *      Portions of this note have been copied forward, however, changed to reflect the most current clinical status of this patient.    Hospital Course:    This patient is a 58-year-old male with PMH GERD and hypertension who [place] [facility] [place]rology consultation due to RANDALL, left hydronephrosis, and flank pain after urologic instrumentation at [facility] on  and 3/8.  Patient presented back to [facility] on 3/17 and was found to be in acute renal failure with creatinine of 1.7; he was admitted, received aggressive hydration, pain control, and IV antibiotics.  A CT was performed on 3/20 which showed perinephritic and periureteral stranding, left hydronephrosis, possible UPJ obstruction.  His blood cultures and urine culture at that facility were negative.  Plan is for cystoscopy, retrograde pyelogram and left ureteral stent placement per urology on 3/21.  His creatinine was elevated at 1.5, otherwise lab work unremarkable upon transfer.  Patient is s/p bilateral ureteral catheterization, bilateral retrograde pyelogram, and left ureteral stent placement on 3/21 per Dr. Hernandez.  He will need to follow-up outpatient in 1 week for stent removal.  Will discharge on Levaquin x 10 days per urology recommendation. RANDALL is resolving and creatinine has improved to 1.1 on day of discharge. Encourage adequate oral intake. His pain is minimal so he may take tylenol or motrin  transcribed; although attempts have made to review the note for such errors, some may still exist.

## 2024-03-22 NOTE — TELEPHONE ENCOUNTER
Leanne discharge nurse called to schedule 1 week (3/29/2024); for stent removal pt has string. Please contact pt when able, thank you.

## 2024-03-22 NOTE — OP NOTE
[facility]              [address] [place] [address]                            OPERATIVE REPORT      PATIENT NAME: SUHAS HANEY                  : 1965  MED REC NO: [id number]                          ROOM: 0523  ACCOUNT NO: [id number]                       ADMIT DATE: 2024  PROVIDER: Justo Hernandez MD      DATE OF PROCEDURE:  2024    SURGEON:  Justo Hernandez MD    TITLE OF OPERATION:    1. Cystoscopy, bilateral ureteral catheterizations, bilateral retrograde pyelograms.  2. Cystoscopy, placement of a left 6-Persian x 28 cm Tria Soft ureteral stent.    PREOPERATIVE DIAGNOSES:    1. Left flank pain.  2. Left hydronephrosis.  3. Bilateral peripelvic cysts.    POSTOPERATIVE DIAGNOSES:    1. Left flank pain.  2. Left hydronephrosis.  3. Bilateral peripelvic cysts.    ANESTHESIA:  General anesthetic.    ESTIMATED BLOOD LOSS:  0 mL.    HISTORY:  The patient is a 58-year-old gentleman who recently underwent ureteroscopic procedure by Dr. Lord at [facility] in [place], [place].  He recently presented with what appears to be 2 different stones, and his initial procedure was on 2024, where he underwent ureteroscopy, stone basket extraction, and stent placement, sounds like for a distal stone.  He then was taken back to the operating room on 2024, where he underwent a laser lithotripsy and stent placement.  His baseline renal function couple of years ago was normal and when he was seen back on , his creatinine was 1.4 and GFR of 58.  The stent was removed after about 3 or 4 days.  A CT scan on 2024 showed no stones in place.  He does have known bilateral peripelvic cysts.  After stent removal, he returned to [facility] on 2024, complaining of fever, worsening left flank pain.  CT scan again showed peripelvic cysts.  Unfortunately, this was not done for urogram protocol and delayed  already received, I believe, Cipro and vancomycin.  I went ahead and gave him some Levaquin on-call.  A time-out was performed.    22-French cystoscope was inserted into the meatus.  This was advanced under direct vision.  The penile and bulbar urethra appeared to be normal.  Entering the prostatic urethra, he has a normal-appearing prostate without obstruction.  Entering the patient's bladder, the bladder was inspected and shows normal mucosa.  No mucosal lesions or abnormalities.  Minimal trabeculation.  There were no foreign bodies.  There were no stones.  The right and left ureteral orifices appeared to be normal.    I then turned my attention to do retrograde pyelograms.  The left ureter was intubated with the ureteral catheter under direct visualization, contrast injected retrograde.  This shows a normal-appearing left ureter.  There was some mild kinking and tortuosity of the ureter approximately at the UPJ, but there was no UPJ obstruction.  The left renal pelvis was without significant hydronephrosis or dilation.  However, there was elongation of the infundibulum, both upper and middle and lower poles consistent with extrinsic compression from peripelvic cysts.  Specifically, the upper pole calyx appeared to be dilated and there appeared to be some debris within the calyx as it did not completely fill out.  I suspect that there were some debris from the prior instrumentation and obstruction from the stone.  Post-injection drainage was good.  As the renal pelvis and ureter decompressed, there was still a hang-up of little contrast in this upper pole calyx.  A 0.035 Sensor-tip guidewire was then inserted through the catheter and advanced under fluoroscopic guidance and this went easily up into this dilated upper pole calyx.  I then passed a 5-French open-ended ureteral catheter up in this calyx and measured for the stent.  Catheter was removed and then over the guidewire, a 6-French x 28 cm Tria Soft ureteral

## 2024-03-23 LAB — BACTERIA UR CULT: NORMAL

## 2024-03-25 LAB
BACTERIA BLD CULT ORG #2: NORMAL
BACTERIA BLD CULT: NORMAL

## 2024-03-28 ENCOUNTER — OFFICE VISIT (OUTPATIENT)
Dept: UROLOGY | Age: 59
End: 2024-03-28
Payer: COMMERCIAL

## 2024-03-28 VITALS — TEMPERATURE: 97.3 F | WEIGHT: 218.2 LBS | BODY MASS INDEX: 27.13 KG/M2 | HEIGHT: 75 IN

## 2024-03-28 DIAGNOSIS — N13.30 HYDRONEPHROSIS, UNSPECIFIED HYDRONEPHROSIS TYPE: Primary | ICD-10-CM

## 2024-03-28 DIAGNOSIS — N28.1 PERIPELVIC (LYMPHATIC) CYST: ICD-10-CM

## 2024-03-28 DIAGNOSIS — Z96.0 RETAINED URETERAL STENT: ICD-10-CM

## 2024-03-28 PROBLEM — K59.00 CONSTIPATION: Status: RESOLVED | Noted: 2024-03-20 | Resolved: 2024-03-28

## 2024-03-28 PROBLEM — N17.9 AKI (ACUTE KIDNEY INJURY) (HCC): Status: RESOLVED | Noted: 2024-03-20 | Resolved: 2024-03-28

## 2024-03-28 PROBLEM — R10.9 LEFT FLANK PAIN: Status: RESOLVED | Noted: 2024-03-21 | Resolved: 2024-03-28

## 2024-03-28 LAB
BACTERIA URINE, POC: ABNORMAL
BILIRUBIN URINE: 0 MG/DL
BLOOD, URINE: POSITIVE
CASTS URINE, POC: ABNORMAL
CLARITY: CLEAR
COLOR: YELLOW
CRYSTALS URINE, POC: ABNORMAL
EPI CELLS URINE, POC: ABNORMAL
GLUCOSE URINE: ABNORMAL
KETONES, URINE: NEGATIVE
LEUKOCYTE EST, POC: ABNORMAL
NITRITE, URINE: NEGATIVE
PH UA: 5.5 (ref 4.5–8)
PROTEIN UA: POSITIVE
RBC URINE, POC: 33
SPECIFIC GRAVITY UA: 1.02 (ref 1–1.03)
UROBILINOGEN, URINE: ABNORMAL
WBC URINE, POC: 1
YEAST URINE, POC: ABNORMAL

## 2024-03-28 PROCEDURE — 99213 OFFICE O/P EST LOW 20 MIN: CPT | Performed by: NURSE PRACTITIONER

## 2024-03-28 PROCEDURE — 81001 URINALYSIS AUTO W/SCOPE: CPT | Performed by: NURSE PRACTITIONER

## 2024-03-28 ASSESSMENT — ENCOUNTER SYMPTOMS
ABDOMINAL DISTENTION: 0
VOMITING: 0
NAUSEA: 0
ABDOMINAL PAIN: 0
BACK PAIN: 0

## 2024-03-28 NOTE — PROGRESS NOTES
Chris Velez is a 58 y.o., male, Established patient who presents today   Chief Complaint   Patient presents with    Follow-up     I am here today for my stent removal.       Patient presents for follow-up after undergoing surgical intervention with Dr. Hernandez.  He had originally had a ureteroscopic procedure with Dr. Lord at [facility] with 2 different stones with his initial procedure on 2/29/2024 to treat a distal stone and a second procedure on 3/8/2024 for treatment of a second stone.  3 to 4 days after the second procedure, the ureteral stent was removed.  Patient continued to have pain after his procedure and return to [facility] on 3/17/2024 with complaints of fever, worsening left flank pain.  He was ultimately discharged home and returned again to the emergency room on 3/20/2024.  He had 2 separate CT scans on each of these visits which did not reveal a stone, but did show bilateral peripelvic cysts.  The latter imaging on 3/20/2024 revealed worsening dilation and stranding of the left kidney with worsening creatinine up to 1.7 from a normal baseline.  He was transferred to our facility as Dr. Ugarte which was not available.  He was taking to the operating room for retrograde evaluation and left ureteral stent placement with Dr. Hernandez.  During the procedure, Dr. Hernandez noted some elongated infundibulum's with extrinsic compression from the peripelvic cyst bilaterally.  Patient has had no significant issues since surgery except for some bothersome stent symptoms.  He remains on antibiotic therapy and has a couple of days left.  Stent was removed in office today without difficulty.    REVIEW OF SYSTEMS:  Review of Systems   Constitutional:  Negative for chills and fever.   Gastrointestinal:  Negative for abdominal distention, abdominal pain, nausea and vomiting.   Genitourinary:  Positive for frequency and urgency. Negative for difficulty urinating, dysuria, flank pain and

## 2024-05-01 ENCOUNTER — HOSPITAL ENCOUNTER (OUTPATIENT)
Dept: ULTRASOUND IMAGING | Age: 59
Discharge: HOME | End: 2024-05-01
Payer: COMMERCIAL

## 2024-05-01 DIAGNOSIS — N13.30 HYDRONEPHROSIS, UNSPECIFIED HYDRONEPHROSIS TYPE: ICD-10-CM

## 2024-05-01 PROCEDURE — 76770 US EXAM ABDO BACK WALL COMP: CPT

## 2024-05-07 ENCOUNTER — OFFICE VISIT (OUTPATIENT)
Dept: UROLOGY | Age: 59
End: 2024-05-07
Payer: COMMERCIAL

## 2024-05-07 VITALS — BODY MASS INDEX: 28.12 KG/M2 | HEIGHT: 75 IN | WEIGHT: 226.2 LBS | TEMPERATURE: 97.7 F

## 2024-05-07 DIAGNOSIS — N28.1 PERIPELVIC (LYMPHATIC) CYST: ICD-10-CM

## 2024-05-07 DIAGNOSIS — R10.9 BILATERAL FLANK PAIN: Primary | ICD-10-CM

## 2024-05-07 LAB
APPEARANCE FLUID: CLEAR
BILIRUBIN, POC: NORMAL
BLOOD URINE, POC: NORMAL
CLARITY, POC: CLEAR
COLOR, POC: YELLOW
GLUCOSE URINE, POC: NORMAL
KETONES, POC: NORMAL
LEUKOCYTE EST, POC: NORMAL
NITRITE, POC: NORMAL
PH, POC: 6
PROTEIN, POC: NORMAL
SPECIFIC GRAVITY, POC: 1.01
UROBILINOGEN, POC: 0.2

## 2024-05-07 PROCEDURE — 81002 URINALYSIS NONAUTO W/O SCOPE: CPT | Performed by: NURSE PRACTITIONER

## 2024-05-07 PROCEDURE — 99213 OFFICE O/P EST LOW 20 MIN: CPT | Performed by: NURSE PRACTITIONER

## 2024-05-07 RX ORDER — KETOROLAC TROMETHAMINE 10 MG/1
10 TABLET, FILM COATED ORAL EVERY 6 HOURS PRN
Qty: 20 TABLET | Refills: 0 | Status: SHIPPED | OUTPATIENT
Start: 2024-05-07

## 2024-05-07 ASSESSMENT — ENCOUNTER SYMPTOMS
ABDOMINAL PAIN: 0
VOMITING: 0
BACK PAIN: 0
ABDOMINAL DISTENTION: 0
NAUSEA: 0

## 2024-05-07 NOTE — PROGRESS NOTES
Chris Velez is a 58 y.o., male, Established patient who presents today   Chief Complaint   Patient presents with    Follow-up     I am here today for my 6 week follow up.  Patient states that he is having side pain & his kidneys are sore.        Patient presents for follow-up after undergoing surgical intervention with Dr. Hernandez. He had originally had a ureteroscopic procedure with Dr. Lord at [facility] with 2 different stones with his initial procedure on 2/29/2024 to treat a distal stone and a second procedure on 3/8/2024 for treatment of a second stone. 3 to 4 days after the second procedure, the ureteral stent was removed. Patient continued to have pain after his procedure and return to [facility] on 3/17/2024 with complaints of fever, worsening left flank pain. He was ultimately discharged home and returned again to the emergency room on 3/20/2024. He had 2 separate CT scans on each of these visits which did not reveal a stone, but did show bilateral peripelvic cysts. The latter imaging on 3/20/2024 revealed worsening dilation and stranding of the left kidney with worsening creatinine up to 1.7 from a normal baseline. He was transferred to our facility as Dr. Ugarte which was not available. He was taking to the operating room for retrograde evaluation and left ureteral stent placement with Dr. Hernandez. During the procedure, Dr. Hernandez noted some elongated infundibulum's with extrinsic compression from the peripelvic cyst bilaterally.  Patient had his stent removed a few weeks ago and follows up with renal ultrasound.  He has been intermittently having some bilateral flank pain and also pain in bilateral groins.  He reports this is typically occurring with activity or movement.  He states this is not similar to previous stone pain.    REVIEW OF SYSTEMS:  Review of Systems   Constitutional:  Negative for chills and fever.   Gastrointestinal:  Negative for abdominal distention, abdominal

## (undated) DEVICE — SOLUTION IRRIG 3000ML 0.9% SOD CHL USP UROMATIC PLAS CONT

## (undated) DEVICE — STERILE LATEX POWDER FREE SURGICAL GLOVES WITH HYDROGEL COATING: Brand: PROTEXIS

## (undated) DEVICE — CATHETER URET 5FR L70CM OPN END SGL LUMN INJ HUB FLEXIMA

## (undated) DEVICE — GLOVE SURG SZ 75 CRM LTX FREE POLYISOPRENE POLYMER BEAD ANTI

## (undated) DEVICE — SURGICAL PROCEDURE PACK CYTOSCOPY

## (undated) DEVICE — Z DUPLICATE USE 2738952 SYSTEM VENT M AD NSL PAP DEV HD STRP 2L HYPRINFL BG MRI

## (undated) DEVICE — SEAL ENDO INSTR SELF SEAL UROLOGY

## (undated) DEVICE — BAG DRNGE COMB PK

## (undated) DEVICE — ENDO KIT,LOURDES HOSPITAL: Brand: MEDLINE INDUSTRIES, INC.

## (undated) DEVICE — SYRINGE,PISTON,IRRIGATION,60ML,STERILE: Brand: MEDLINE

## (undated) DEVICE — GUIDEWIRE ENDOSCP L150CM DIA0.035IN TIP 3CM PTFE NIT

## (undated) DEVICE — PAD,EYE,1-5/8X2 5/8,STERILE,LF,1/PK: Brand: MEDLINE